# Patient Record
Sex: FEMALE | Race: OTHER | Employment: UNEMPLOYED | URBAN - METROPOLITAN AREA
[De-identification: names, ages, dates, MRNs, and addresses within clinical notes are randomized per-mention and may not be internally consistent; named-entity substitution may affect disease eponyms.]

---

## 2018-01-29 ENCOUNTER — OFFICE VISIT (OUTPATIENT)
Dept: OBGYN CLINIC | Age: 28
End: 2018-01-29

## 2018-01-29 ENCOUNTER — HOSPITAL ENCOUNTER (OUTPATIENT)
Age: 28
Setting detail: SPECIMEN
Discharge: HOME | End: 2018-01-29

## 2018-01-29 VITALS
HEIGHT: 64 IN | BODY MASS INDEX: 20.32 KG/M2 | WEIGHT: 119 LBS | DIASTOLIC BLOOD PRESSURE: 82 MMHG | SYSTOLIC BLOOD PRESSURE: 114 MMHG | HEART RATE: 98 BPM

## 2018-01-29 DIAGNOSIS — L65.9 PATCHY LOSS OF HAIR: ICD-10-CM

## 2018-01-29 DIAGNOSIS — L68.0 HIRSUTISM: Primary | ICD-10-CM

## 2018-01-29 DIAGNOSIS — Z82.49 FAMILY HISTORY OF DVT: ICD-10-CM

## 2018-01-29 DIAGNOSIS — L68.0 HIRSUTISM: ICD-10-CM

## 2018-01-29 DIAGNOSIS — E28.2 PCOS (POLYCYSTIC OVARIAN SYNDROME): ICD-10-CM

## 2018-01-29 LAB
SEX HORMONE BINDING GLOBULIN: 64 NMOL/L (ref 30–135)
TESTOSTERONE FREE-NONMALE: 4.6 PG/ML (ref 0.8–7.4)
TESTOSTERONE TOTAL: 40 NG/DL (ref 20–70)
THYROXINE, FREE: 1.24 NG/DL (ref 0.93–1.7)
TSH SERPL DL<=0.05 MIU/L-ACNC: 1.14 MIU/L (ref 0.3–5)

## 2018-01-29 PROCEDURE — 99213 OFFICE O/P EST LOW 20 MIN: CPT | Performed by: OBSTETRICS & GYNECOLOGY

## 2018-01-29 RX ORDER — SPIRONOLACTONE 25 MG/1
25 TABLET ORAL DAILY
Qty: 30 TABLET | Refills: 3 | Status: SHIPPED | OUTPATIENT
Start: 2018-01-29 | End: 2018-07-06 | Stop reason: SDUPTHER

## 2018-01-29 ASSESSMENT — ENCOUNTER SYMPTOMS
PHOTOPHOBIA: 0
SHORTNESS OF BREATH: 0
COUGH: 0
ABDOMINAL PAIN: 0

## 2018-01-29 NOTE — PROGRESS NOTES
[facility] PHYSICIANS  [facility] - [address] [place] [id number]  Dept: [phone]  Dept Fax: [phone]    18    Chief Complaint   Patient presents with   [address]    Irregular Menses       Vik Anderson 29 y.o. is here to discuss her PCOS. She is currently on metformin once a day and is now having periods every month to every other month. She is having increased facial hair growth and hair thinning on the crown of her head. She waxes and plucks her hair often. She has a family history of DVTs, so cannot be on combination birth control. Pt wants to know what she can do about her issues. Review of Systems   Constitutional: Positive for unexpected weight change (weight gain). Negative for activity change, chills and fever. HENT:        Hair loss  Facial hair increase   Eyes: Negative for photophobia. Respiratory: Negative for cough and shortness of breath. Gastrointestinal: Negative for abdominal pain. Endocrine: Negative for cold intolerance and heat intolerance. Genitourinary: Negative for menstrual problem and vaginal discharge. Neurological: Negative for dizziness and light-headedness. Gynecologic History  Patient's last menstrual period was 2017 (approximate). 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   No Known Allergies  Current Outpatient Prescriptions   Medication Sig Dispense Refill    spironolactone (ALDACTONE) 25 MG tablet Take 1 tablet by mouth daily 30 tablet 3    metFORMIN (GLUCOPHAGE) 500 MG tablet Take 1 tablet by mouth 2 times daily (with meals) 60 tablet 3 Patchy loss of hair  - Will rule out thyroid issues vs increased androgens with PCOS  - TSH; Future  - T4, Free; Future  - Testosterone, Free; Future    3. PCOS (polycystic ovarian syndrome)  - Pt unable to use birth control due to family h/o DVTs. Will try to continue to regulate periods with metformin  - spironolactone (ALDACTONE) 25 MG tablet; Take 1 tablet by mouth daily  Dispense: 30 tablet; Refill: 3  - metFORMIN (GLUCOPHAGE) 500 MG tablet; Take 1 tablet by mouth 2 times daily (with meals)  Dispense: 60 tablet;  Refill: 3    Will call pt with results of lab work    Tia Danielle MD  [address]

## 2018-06-26 ENCOUNTER — OFFICE VISIT (OUTPATIENT)
Dept: FAMILY MEDICINE CLINIC | Age: 28
End: 2018-06-26

## 2018-06-26 VITALS
OXYGEN SATURATION: 98 % | HEIGHT: 64 IN | WEIGHT: 111 LBS | HEART RATE: 94 BPM | DIASTOLIC BLOOD PRESSURE: 68 MMHG | SYSTOLIC BLOOD PRESSURE: 102 MMHG | BODY MASS INDEX: 18.95 KG/M2

## 2018-06-26 DIAGNOSIS — L57.8 DERMATITIS DUE TO SUN: Primary | ICD-10-CM

## 2018-06-26 DIAGNOSIS — Z00.00 PHYSICAL EXAM, ANNUAL: ICD-10-CM

## 2018-06-26 DIAGNOSIS — R01.2 ABNORMAL HEART SOUNDS: ICD-10-CM

## 2018-06-26 PROCEDURE — 99213 OFFICE O/P EST LOW 20 MIN: CPT | Performed by: FAMILY MEDICINE

## 2018-06-26 ASSESSMENT — ENCOUNTER SYMPTOMS
BACK PAIN: 0
EYE REDNESS: 0
DIARRHEA: 0
ABDOMINAL PAIN: 0
VOICE CHANGE: 0
COLOR CHANGE: 0
VOMITING: 0
RECTAL PAIN: 0
CONSTIPATION: 0
TROUBLE SWALLOWING: 0
CHEST TIGHTNESS: 0
BLOOD IN STOOL: 0
SINUS PRESSURE: 0
COUGH: 0
EYE PAIN: 0
EYE DISCHARGE: 0
ANAL BLEEDING: 0
ABDOMINAL DISTENTION: 0
NAUSEA: 0
SHORTNESS OF BREATH: 0

## 2018-06-26 ASSESSMENT — PATIENT HEALTH QUESTIONNAIRE - PHQ9
SUM OF ALL RESPONSES TO PHQ9 QUESTIONS 1 & 2: 0
SUM OF ALL RESPONSES TO PHQ QUESTIONS 1-9: 0
SUM OF ALL RESPONSES TO PHQ QUESTIONS 1-9: 0
2. FEELING DOWN, DEPRESSED OR HOPELESS: 0
1. LITTLE INTEREST OR PLEASURE IN DOING THINGS: 0
SUM OF ALL RESPONSES TO PHQ QUESTIONS 1-9: 0
SUM OF ALL RESPONSES TO PHQ9 QUESTIONS 1 & 2: 0
2. FEELING DOWN, DEPRESSED OR HOPELESS: 0
SUM OF ALL RESPONSES TO PHQ9 QUESTIONS 1 & 2: 0
1. LITTLE INTEREST OR PLEASURE IN DOING THINGS: 0
2. FEELING DOWN, DEPRESSED OR HOPELESS: 0
1. LITTLE INTEREST OR PLEASURE IN DOING THINGS: 0

## 2018-07-05 LAB
LEFT VENTRICULAR EJECTION FRACTION HIGH VALUE: NORMAL %
LEFT VENTRICULAR EJECTION FRACTION MODE: NORMAL
LV EF: NORMAL %

## 2018-07-06 DIAGNOSIS — E28.2 PCOS (POLYCYSTIC OVARIAN SYNDROME): ICD-10-CM

## 2018-07-06 DIAGNOSIS — L68.0 HIRSUTISM: ICD-10-CM

## 2018-07-06 RX ORDER — SPIRONOLACTONE 25 MG/1
25 TABLET ORAL DAILY
Qty: 30 TABLET | Refills: 3 | Status: SHIPPED | OUTPATIENT
Start: 2018-07-06 | End: 2018-12-17 | Stop reason: SDUPTHER

## 2018-07-13 DIAGNOSIS — R01.2 ABNORMAL HEART SOUNDS: ICD-10-CM

## 2018-07-24 ENCOUNTER — TELEPHONE (OUTPATIENT)
Dept: FAMILY MEDICINE CLINIC | Age: 28
End: 2018-07-24

## 2018-11-23 DIAGNOSIS — E28.2 PCOS (POLYCYSTIC OVARIAN SYNDROME): ICD-10-CM

## 2018-11-23 DIAGNOSIS — L68.0 HIRSUTISM: ICD-10-CM

## 2018-12-17 DIAGNOSIS — E28.2 PCOS (POLYCYSTIC OVARIAN SYNDROME): ICD-10-CM

## 2018-12-17 DIAGNOSIS — L68.0 HIRSUTISM: ICD-10-CM

## 2018-12-17 RX ORDER — SPIRONOLACTONE 25 MG/1
25 TABLET ORAL DAILY
Qty: 30 TABLET | Refills: 3 | Status: SHIPPED | OUTPATIENT
Start: 2018-12-17 | End: 2019-02-25 | Stop reason: CLARIF

## 2019-01-11 ENCOUNTER — OFFICE VISIT (OUTPATIENT)
Dept: OBGYN CLINIC | Age: 29
End: 2019-01-11

## 2019-01-11 VITALS
HEART RATE: 85 BPM | DIASTOLIC BLOOD PRESSURE: 74 MMHG | HEIGHT: 64 IN | SYSTOLIC BLOOD PRESSURE: 123 MMHG | BODY MASS INDEX: 18.78 KG/M2 | WEIGHT: 110 LBS

## 2019-01-11 DIAGNOSIS — L64.9 MALE PATTERN BALDNESS: ICD-10-CM

## 2019-01-11 DIAGNOSIS — E28.2 PCOS (POLYCYSTIC OVARIAN SYNDROME): Primary | ICD-10-CM

## 2019-01-11 DIAGNOSIS — L68.0 HIRSUTISM: ICD-10-CM

## 2019-01-11 PROCEDURE — 99213 OFFICE O/P EST LOW 20 MIN: CPT | Performed by: OBSTETRICS & GYNECOLOGY

## 2019-01-11 ASSESSMENT — ENCOUNTER SYMPTOMS
COUGH: 0
ABDOMINAL PAIN: 0
SHORTNESS OF BREATH: 0
BACK PAIN: 0

## 2019-02-22 ENCOUNTER — TELEPHONE (OUTPATIENT)
Dept: OBGYN CLINIC | Age: 29
End: 2019-02-22

## 2019-02-25 ENCOUNTER — OFFICE VISIT (OUTPATIENT)
Dept: FAMILY MEDICINE CLINIC | Age: 29
End: 2019-02-25

## 2019-02-25 VITALS
SYSTOLIC BLOOD PRESSURE: 116 MMHG | HEART RATE: 102 BPM | TEMPERATURE: 97.2 F | OXYGEN SATURATION: 98 % | DIASTOLIC BLOOD PRESSURE: 76 MMHG

## 2019-02-25 DIAGNOSIS — Z11.4 SCREENING FOR HIV (HUMAN IMMUNODEFICIENCY VIRUS): ICD-10-CM

## 2019-02-25 DIAGNOSIS — R07.9 CHEST PAIN, UNSPECIFIED TYPE: ICD-10-CM

## 2019-02-25 DIAGNOSIS — E53.9 VITAMIN B DEFICIENCY: ICD-10-CM

## 2019-02-25 DIAGNOSIS — R68.89 SENSITIVITY TO THE COLD: ICD-10-CM

## 2019-02-25 DIAGNOSIS — Z87.2: ICD-10-CM

## 2019-02-25 DIAGNOSIS — E55.9 VITAMIN D DEFICIENCY: Primary | ICD-10-CM

## 2019-02-25 DIAGNOSIS — I73.00 RAYNAUD'S PHENOMENON WITHOUT GANGRENE: ICD-10-CM

## 2019-02-25 LAB
ALBUMIN SERPL-MCNC: NORMAL G/DL
ALP BLD-CCNC: NORMAL U/L
ALT SERPL-CCNC: NORMAL U/L
ANION GAP SERPL CALCULATED.3IONS-SCNC: NORMAL MMOL/L
AST SERPL-CCNC: NORMAL U/L
BASOPHILS ABSOLUTE: NORMAL /ΜL
BASOPHILS RELATIVE PERCENT: NORMAL %
BILIRUB SERPL-MCNC: NORMAL MG/DL (ref 0.1–1.4)
BUN BLDV-MCNC: NORMAL MG/DL
CALCIUM SERPL-MCNC: NORMAL MG/DL
CHLORIDE BLD-SCNC: NORMAL MMOL/L
CO2: NORMAL MMOL/L
CREAT SERPL-MCNC: 0.72 MG/DL
EOSINOPHILS ABSOLUTE: NORMAL /ΜL
EOSINOPHILS RELATIVE PERCENT: NORMAL %
GFR CALCULATED: NORMAL
GLUCOSE BLD-MCNC: NORMAL MG/DL
HCT VFR BLD CALC: NORMAL % (ref 36–46)
HEMOGLOBIN: NORMAL G/DL (ref 12–16)
LYMPHOCYTES ABSOLUTE: NORMAL /ΜL
LYMPHOCYTES RELATIVE PERCENT: NORMAL %
MCH RBC QN AUTO: NORMAL PG
MCHC RBC AUTO-ENTMCNC: NORMAL G/DL
MCV RBC AUTO: NORMAL FL
MONOCYTES ABSOLUTE: NORMAL /ΜL
MONOCYTES RELATIVE PERCENT: NORMAL %
NEUTROPHILS ABSOLUTE: NORMAL /ΜL
NEUTROPHILS RELATIVE PERCENT: NORMAL %
PLATELET # BLD: NORMAL K/ΜL
PMV BLD AUTO: NORMAL FL
POTASSIUM SERPL-SCNC: 4.5 MMOL/L
RBC # BLD: NORMAL 10^6/ΜL
SODIUM BLD-SCNC: NORMAL MMOL/L
TOTAL PROTEIN: NORMAL
VITAMIN B-12: NORMAL
WBC # BLD: NORMAL 10^3/ML

## 2019-02-25 PROCEDURE — 99204 OFFICE O/P NEW MOD 45 MIN: CPT | Performed by: FAMILY MEDICINE

## 2019-02-25 ASSESSMENT — PATIENT HEALTH QUESTIONNAIRE - PHQ9
2. FEELING DOWN, DEPRESSED OR HOPELESS: 1
2. FEELING DOWN, DEPRESSED OR HOPELESS: 0
1. LITTLE INTEREST OR PLEASURE IN DOING THINGS: 0
SUM OF ALL RESPONSES TO PHQ QUESTIONS 1-9: 1
SUM OF ALL RESPONSES TO PHQ QUESTIONS 1-9: 0
SUM OF ALL RESPONSES TO PHQ9 QUESTIONS 1 & 2: 0
SUM OF ALL RESPONSES TO PHQ QUESTIONS 1-9: 1
SUM OF ALL RESPONSES TO PHQ QUESTIONS 1-9: 0
SUM OF ALL RESPONSES TO PHQ9 QUESTIONS 1 & 2: 1
1. LITTLE INTEREST OR PLEASURE IN DOING THINGS: 0

## 2019-02-25 ASSESSMENT — ENCOUNTER SYMPTOMS
ANAL BLEEDING: 0
RECTAL PAIN: 0
BLOOD IN STOOL: 0
NAUSEA: 0
EYE DISCHARGE: 0
TROUBLE SWALLOWING: 0
VOMITING: 0
BACK PAIN: 0
COLOR CHANGE: 0
CONSTIPATION: 0
CHEST TIGHTNESS: 0
VOICE CHANGE: 0
COUGH: 0
ABDOMINAL PAIN: 0
SHORTNESS OF BREATH: 0
EYE REDNESS: 0
EYE PAIN: 0
DIARRHEA: 0
ABDOMINAL DISTENTION: 0
SINUS PRESSURE: 0

## 2019-02-26 DIAGNOSIS — E53.9 VITAMIN B DEFICIENCY: ICD-10-CM

## 2019-02-26 DIAGNOSIS — R07.9 CHEST PAIN, UNSPECIFIED TYPE: ICD-10-CM

## 2019-08-21 DIAGNOSIS — E28.2 PCOS (POLYCYSTIC OVARIAN SYNDROME): ICD-10-CM

## 2019-08-21 DIAGNOSIS — L68.0 HIRSUTISM: ICD-10-CM

## 2019-09-05 ENCOUNTER — TELEPHONE (OUTPATIENT)
Dept: OBGYN CLINIC | Age: 29
End: 2019-09-05

## 2019-09-05 NOTE — TELEPHONE ENCOUNTER
BEBETO for pt to call prior to her appt today. Needs to know that she typically sees Dr. Geri Stewart but is on [facility] schedule. Pt can keep appointment if she decides that is what she wants to do or we can offer for her to see Dr. Emmanuel Nogueira this afternoon.

## 2019-09-10 ENCOUNTER — OFFICE VISIT (OUTPATIENT)
Dept: FAMILY MEDICINE CLINIC | Age: 29
End: 2019-09-10
Payer: MEDICAID

## 2019-09-10 VITALS
HEIGHT: 64 IN | SYSTOLIC BLOOD PRESSURE: 108 MMHG | BODY MASS INDEX: 17.72 KG/M2 | DIASTOLIC BLOOD PRESSURE: 78 MMHG | OXYGEN SATURATION: 98 % | WEIGHT: 103.8 LBS | HEART RATE: 103 BPM

## 2019-09-10 DIAGNOSIS — L65.9 HAIR LOSS: ICD-10-CM

## 2019-09-10 DIAGNOSIS — L67.8 ABNORMAL FACIAL HAIR: Primary | ICD-10-CM

## 2019-09-10 DIAGNOSIS — R20.9 COLD HANDS AND FEET: ICD-10-CM

## 2019-09-10 DIAGNOSIS — E55.9 VITAMIN D DEFICIENCY: ICD-10-CM

## 2019-09-10 DIAGNOSIS — Z13.29 SCREENING FOR THYROID DISORDER: ICD-10-CM

## 2019-09-10 DIAGNOSIS — R68.89 SENSITIVITY TO THE COLD: ICD-10-CM

## 2019-09-10 DIAGNOSIS — F41.1 GAD (GENERALIZED ANXIETY DISORDER): ICD-10-CM

## 2019-09-10 PROCEDURE — 99214 OFFICE O/P EST MOD 30 MIN: CPT | Performed by: FAMILY MEDICINE

## 2019-09-10 PROCEDURE — G8419 CALC BMI OUT NRM PARAM NOF/U: HCPCS | Performed by: FAMILY MEDICINE

## 2019-09-10 PROCEDURE — 1036F TOBACCO NON-USER: CPT | Performed by: FAMILY MEDICINE

## 2019-09-10 PROCEDURE — G8427 DOCREV CUR MEDS BY ELIG CLIN: HCPCS | Performed by: FAMILY MEDICINE

## 2019-09-10 RX ORDER — BUSPIRONE HYDROCHLORIDE 10 MG/1
10 TABLET ORAL 3 TIMES DAILY PRN
Qty: 30 TABLET | Refills: 0 | Status: SHIPPED | OUTPATIENT
Start: 2019-09-10 | End: 2022-03-23

## 2019-09-10 ASSESSMENT — ENCOUNTER SYMPTOMS
BACK PAIN: 0
EYE DISCHARGE: 0
CONSTIPATION: 0
SHORTNESS OF BREATH: 0
SINUS PRESSURE: 0
VOICE CHANGE: 0
COUGH: 0
TROUBLE SWALLOWING: 0
VOMITING: 0
DIARRHEA: 0
ABDOMINAL DISTENTION: 0
EYE REDNESS: 0
ABDOMINAL PAIN: 0
EYE PAIN: 0
RECTAL PAIN: 0
CHEST TIGHTNESS: 0
COLOR CHANGE: 0
NAUSEA: 0
BLOOD IN STOOL: 0
ANAL BLEEDING: 0

## 2019-09-10 ASSESSMENT — PATIENT HEALTH QUESTIONNAIRE - PHQ9
SUM OF ALL RESPONSES TO PHQ QUESTIONS 1-9: 0
SUM OF ALL RESPONSES TO PHQ QUESTIONS 1-9: 0
SUM OF ALL RESPONSES TO PHQ9 QUESTIONS 1 & 2: 0
1. LITTLE INTEREST OR PLEASURE IN DOING THINGS: 0
2. FEELING DOWN, DEPRESSED OR HOPELESS: 0

## 2019-09-10 NOTE — PROGRESS NOTES
Subjective:      Patient ID: Crystal Quesada is a 34 y.o. female. HPI  States feet and toes get numb when exposed to the cold, has to wear loose sock as even happens when   The socks are tight. Seriously thinking of relocating as sx are worse in the winter. I had referred her to a specialist for these complaints but she never did follow up- states does not know what happened to the referral.  States does not take buspar but wants a refill just in case she has a bad episode. Still not wanting to take meds for this long term as well. Also c/o chin hair that is growing more than usual, states worse after stopping spironolactone. States also hait to the top of her head getting more and more sparse. Last visit had ordered thyroid as well as Vit D as well as other labs, some done but not all, so will reorder ones she did not do. 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 Review of Systems   Constitutional: Negative for activity change, appetite change and fatigue. HENT: Negative for dental problem, ear pain, hearing loss, postnasal drip, sinus pressure, sneezing, tinnitus, trouble swallowing and voice change. Eyes: Negative for pain, discharge, redness and visual disturbance. Respiratory: Negative for cough, chest tightness and shortness of breath. Cardiovascular: Negative for chest pain, palpitations and leg swelling. Gastrointestinal: Negative for abdominal distention, abdominal pain, anal bleeding, blood in stool, constipation, diarrhea, nausea, rectal pain and vomiting. Endocrine: Negative for cold intolerance, heat intolerance, polydipsia, polyphagia and polyuria. Genitourinary: Negative for decreased urine volume, difficulty urinating, dyspareunia, dysuria, enuresis, flank pain, frequency, genital sores, hematuria, menstrual problem, pelvic pain, urgency, vaginal bleeding and vaginal discharge.    Musculoskeletal: Negative for arthralgias, back pain, gait problem, joint swelling, myalgias, neck pain and neck stiffness. Skin: Negative for color change, pallor and rash. Allergic/Immunologic: Negative for environmental allergies, food allergies and immunocompromised state. Neurological: Negative for dizziness, tremors, seizures, syncope, facial asymmetry, speech difficulty, weakness, light-headedness, numbness and headaches. Hematological: Negative for adenopathy. Does not bruise/bleed easily. Psychiatric/Behavioral: Negative for agitation, behavioral problems, confusion, decreased concentration, sleep disturbance and suicidal ideas. The patient is not nervous/anxious. Objective:   Physical Exam   Constitutional: She is oriented to person, place, and time. No distress. HENT:   Head: Normocephalic and atraumatic. Right Ear: External ear normal.   Left Ear: External ear normal.   Eyes: Pupils are equal, round, and reactive to light. Conjunctivae and EOM are normal.   Neck: Normal range of motion. No tracheal deviation present. No thyromegaly present. Cardiovascular: Normal rate, regular rhythm and intact distal pulses. Exam reveals no gallop and no friction rub. No murmur heard. Pulmonary/Chest: No stridor. No respiratory distress. She has no wheezes. She has no rales. She exhibits no tenderness. Abdominal: Soft. Bowel sounds are normal. She exhibits no distension. There is no tenderness. There is no rebound. Musculoskeletal: Normal range of motion. Lymphadenopathy:     She has no cervical adenopathy. Neurological: She is alert and oriented to person, place, and time. She displays normal reflexes. No cranial nerve deficit. She exhibits normal muscle tone. Skin: Skin is warm. No rash noted. No erythema. No pallor. Assessment:       Diagnosis Orders   1. Abnormal facial hair  Isaiah Do MD, Dermatology, Ginny Shepherd MD, Dermatology, [place]   2.  OLIVIA (generalized

## 2019-09-19 ENCOUNTER — HOSPITAL ENCOUNTER (OUTPATIENT)
Age: 29
Setting detail: SPECIMEN
Discharge: HOME | End: 2019-09-19
Payer: MEDICAID

## 2019-09-19 DIAGNOSIS — E55.9 VITAMIN D DEFICIENCY: ICD-10-CM

## 2019-09-19 DIAGNOSIS — Z13.29 SCREENING FOR THYROID DISORDER: ICD-10-CM

## 2019-09-19 LAB
T3 FREE: 2.44 PG/ML (ref 2.02–4.43)
THYROXINE, FREE: 1.21 NG/DL (ref 0.93–1.7)
TSH SERPL DL<=0.05 MIU/L-ACNC: 1.29 MIU/L (ref 0.3–5)
VITAMIN D 25-HYDROXY: 49.8 NG/ML (ref 30–100)

## 2019-09-24 ENCOUNTER — OFFICE VISIT (OUTPATIENT)
Dept: OBGYN CLINIC | Age: 29
End: 2019-09-24
Payer: MEDICAID

## 2019-09-24 ENCOUNTER — HOSPITAL ENCOUNTER (OUTPATIENT)
Age: 29
Setting detail: SPECIMEN
Discharge: HOME | End: 2019-09-24
Payer: MEDICAID

## 2019-09-24 VITALS
WEIGHT: 113 LBS | HEART RATE: 60 BPM | SYSTOLIC BLOOD PRESSURE: 108 MMHG | BODY MASS INDEX: 19.29 KG/M2 | HEIGHT: 64 IN | DIASTOLIC BLOOD PRESSURE: 66 MMHG

## 2019-09-24 DIAGNOSIS — L68.0 HIRSUTISM: ICD-10-CM

## 2019-09-24 DIAGNOSIS — L65.9 ALOPECIA: Primary | ICD-10-CM

## 2019-09-24 DIAGNOSIS — E28.2 PCOS (POLYCYSTIC OVARIAN SYNDROME): ICD-10-CM

## 2019-09-24 LAB
SEX HORMONE BINDING GLOBULIN: 67 NMOL/L (ref 30–135)
TESTOSTERONE FREE-NONMALE: 2.2 PG/ML (ref 0.8–7.4)
TESTOSTERONE TOTAL: 20 NG/DL (ref 20–70)

## 2019-09-24 PROCEDURE — G8427 DOCREV CUR MEDS BY ELIG CLIN: HCPCS | Performed by: OBSTETRICS & GYNECOLOGY

## 2019-09-24 PROCEDURE — 1036F TOBACCO NON-USER: CPT | Performed by: OBSTETRICS & GYNECOLOGY

## 2019-09-24 PROCEDURE — G8420 CALC BMI NORM PARAMETERS: HCPCS | Performed by: OBSTETRICS & GYNECOLOGY

## 2019-09-24 PROCEDURE — 99213 OFFICE O/P EST LOW 20 MIN: CPT | Performed by: OBSTETRICS & GYNECOLOGY

## 2019-09-24 RX ORDER — SPIRONOLACTONE 25 MG/1
25 TABLET ORAL DAILY
Qty: 30 TABLET | Refills: 5 | Status: SHIPPED | OUTPATIENT
Start: 2019-09-24 | End: 2020-10-14

## 2019-09-24 ASSESSMENT — ENCOUNTER SYMPTOMS
COUGH: 0
ABDOMINAL PAIN: 0
SHORTNESS OF BREATH: 0
BACK PAIN: 0

## 2019-10-02 ENCOUNTER — OFFICE VISIT (OUTPATIENT)
Dept: DERMATOLOGY | Age: 29
End: 2019-10-02
Payer: MEDICAID

## 2019-10-02 VITALS
OXYGEN SATURATION: 97 % | SYSTOLIC BLOOD PRESSURE: 108 MMHG | DIASTOLIC BLOOD PRESSURE: 76 MMHG | HEART RATE: 84 BPM | BODY MASS INDEX: 20.93 KG/M2 | WEIGHT: 106.6 LBS | HEIGHT: 60 IN

## 2019-10-02 DIAGNOSIS — L65.8 FEMALE PATTERN ALOPECIA: ICD-10-CM

## 2019-10-02 DIAGNOSIS — L21.9 SEBORRHEIC DERMATITIS: Primary | ICD-10-CM

## 2019-10-02 DIAGNOSIS — L68.0 HIRSUTISM: ICD-10-CM

## 2019-10-02 PROCEDURE — G8427 DOCREV CUR MEDS BY ELIG CLIN: HCPCS | Performed by: DERMATOLOGY

## 2019-10-02 PROCEDURE — 1036F TOBACCO NON-USER: CPT | Performed by: DERMATOLOGY

## 2019-10-02 PROCEDURE — 99202 OFFICE O/P NEW SF 15 MIN: CPT | Performed by: DERMATOLOGY

## 2019-10-02 PROCEDURE — G8420 CALC BMI NORM PARAMETERS: HCPCS | Performed by: DERMATOLOGY

## 2019-10-02 PROCEDURE — G8484 FLU IMMUNIZE NO ADMIN: HCPCS | Performed by: DERMATOLOGY

## 2019-10-02 RX ORDER — KETOCONAZOLE 20 MG/ML
SHAMPOO TOPICAL
Qty: 120 ML | Refills: 2 | Status: SHIPPED | OUTPATIENT
Start: 2019-10-02 | End: 2020-10-14

## 2019-10-02 RX ORDER — FLUOCINONIDE TOPICAL SOLUTION USP, 0.05% 0.5 MG/ML
SOLUTION TOPICAL
Qty: 60 ML | Refills: 2 | Status: SHIPPED | OUTPATIENT
Start: 2019-10-02 | End: 2020-10-14

## 2019-12-23 ENCOUNTER — TELEPHONE (OUTPATIENT)
Dept: DERMATOLOGY | Age: 29
End: 2019-12-23

## 2019-12-23 NOTE — TELEPHONE ENCOUNTER
Call from pt says you recommended she use rogaine on her scalp but can she usee it at the same time she is using the other meds that were prescribed and is it otc or does she needs a Rx.   Pharm [facility]/ Manjeet Hoffmann and JANAY Brown

## 2020-05-20 ENCOUNTER — TELEMEDICINE (OUTPATIENT)
Dept: DERMATOLOGY | Age: 30
End: 2020-05-20
Payer: MEDICAID

## 2020-05-20 PROCEDURE — G8428 CUR MEDS NOT DOCUMENT: HCPCS | Performed by: DERMATOLOGY

## 2020-05-20 PROCEDURE — 99213 OFFICE O/P EST LOW 20 MIN: CPT | Performed by: DERMATOLOGY

## 2020-05-20 RX ORDER — FLUOCINOLONE ACETONIDE 0.11 MG/ML
OIL TOPICAL
Qty: 120 ML | Refills: 2 | Status: SHIPPED | OUTPATIENT
Start: 2020-05-20 | End: 2020-10-14

## 2020-05-20 NOTE — PROGRESS NOTES
TELEHEALTH EVALUATION -- Audio/Visual (During TXNVO-63 public health emergency)    Dermatology Patient Note  [facility] [id number]  [address] [id number]  Dept: [phone]  Dept Fax: [phone]      VISIT DATE: 5/20/2020   REFERRING PROVIDER: No ref. provider found      Sy Cornell is a 27 y.o. female  who presents today in the office for:    No chief complaint on file. HISTORY OF PRESENT ILLNESS:  Patient with PCOS presents for f/u seborrheic dermatitis and hirsutism  Interval history: patient continues to have itchy scalp with sores. Used ketoconazole which helped some but she thought it was too drying for her hair. Is now using a tea tree shampoo and it seems to be calming it. She used the fluocinonide solution for just one month and didn't notice a difference so stopped. She stopped spironolactone because it made her lightheaded.    Current treatment and adherence: tea tree shampoo only  Prior treatments tried: see initial HPI  Is using a home IPL for laser hair removal, wants to know if she can use it on abdomen or face or what else I might recommend for hair removal      CURRENT MEDICATIONS:   Current Outpatient Medications   Medication Sig Dispense Refill    fluocinolone (DERMA-SMOOTHE/FS BODY) 0.01 % OIL external oil Massage into scalp at night and wash off in the morning 3-4 times per week 120 mL 2    mupirocin (BACTROBAN) 2 % ointment Apply 3 times daily to sores on scalp until resolved 15 g 2    metFORMIN (GLUCOPHAGE) 500 MG tablet Take 1 tablet by mouth 2 times daily (with meals) 60 tablet 5    ketoconazole (NIZORAL) 2 % shampoo Apply 3-4 times weekly to scalp, leave on for five minutes prior to washing off 120 mL 2    fluocinonide (LIDEX) 0.05 % external solution Apply to scalp daily for rash 60 mL 2    spironolactone (ALDACTONE) 25 MG tablet Take 1 tablet by mouth daily (Patient not taking: Reported on 10/2/2019) 30 tablet 5   

## 2020-05-20 NOTE — PATIENT INSTRUCTIONS
Massage fluocinolone oil into at night, cover with shower cap and shampoo off in the morning  If you develop a sore, apply mupirocin ointment up to 3 times daily until resolved    Consider laser hair removal with Nd:yag (Dr. Margaret Clemente) or electrolysis    Follow up 2 months

## 2020-10-14 ENCOUNTER — OFFICE VISIT (OUTPATIENT)
Dept: FAMILY MEDICINE CLINIC | Age: 30
End: 2020-10-14
Payer: MEDICAID

## 2020-10-14 VITALS
OXYGEN SATURATION: 100 % | HEIGHT: 60 IN | HEART RATE: 106 BPM | DIASTOLIC BLOOD PRESSURE: 72 MMHG | SYSTOLIC BLOOD PRESSURE: 103 MMHG | TEMPERATURE: 98.1 F | BODY MASS INDEX: 21.2 KG/M2 | WEIGHT: 108 LBS

## 2020-10-14 PROBLEM — R42 LIGHTHEADEDNESS: Status: ACTIVE | Noted: 2020-10-14

## 2020-10-14 PROBLEM — K59.09 OTHER CONSTIPATION: Status: ACTIVE | Noted: 2020-10-14

## 2020-10-14 PROBLEM — R14.0 BLOATING: Status: ACTIVE | Noted: 2020-10-14

## 2020-10-14 PROBLEM — E28.2 PCOS (POLYCYSTIC OVARIAN SYNDROME): Status: ACTIVE | Noted: 2020-10-14

## 2020-10-14 LAB
ALBUMIN SERPL-MCNC: 4.6 G/DL
ALP BLD-CCNC: 49 U/L
ALT SERPL-CCNC: 8 U/L
ANION GAP SERPL CALCULATED.3IONS-SCNC: NORMAL MMOL/L
AST SERPL-CCNC: 21 U/L
AVERAGE GLUCOSE: 103
BASOPHILS ABSOLUTE: NORMAL
BASOPHILS RELATIVE PERCENT: NORMAL
BILIRUB SERPL-MCNC: 0.4 MG/DL (ref 0.1–1.4)
BUN BLDV-MCNC: 9 MG/DL
CALCIUM SERPL-MCNC: 9.6 MG/DL
CHLORIDE BLD-SCNC: 103 MMOL/L
CO2: 26 MMOL/L
CREAT SERPL-MCNC: 0.65 MG/DL
EOSINOPHILS ABSOLUTE: NORMAL
EOSINOPHILS RELATIVE PERCENT: NORMAL
GFR CALCULATED: NORMAL
GLUCOSE BLD-MCNC: 90 MG/DL
HBA1C MFR BLD: 5.2 %
HCT VFR BLD CALC: NORMAL %
HEMOGLOBIN: NORMAL
HIV AG/AB: NORMAL
LYMPHOCYTES ABSOLUTE: NORMAL
LYMPHOCYTES RELATIVE PERCENT: NORMAL
MCH RBC QN AUTO: NORMAL PG
MCHC RBC AUTO-ENTMCNC: NORMAL G/DL
MCV RBC AUTO: NORMAL FL
MONOCYTES ABSOLUTE: NORMAL
MONOCYTES RELATIVE PERCENT: NORMAL
NEUTROPHILS ABSOLUTE: NORMAL
NEUTROPHILS RELATIVE PERCENT: NORMAL
PLATELET # BLD: NORMAL 10*3/UL
PMV BLD AUTO: NORMAL FL
POTASSIUM SERPL-SCNC: 4.1 MMOL/L
RBC # BLD: NORMAL 10*6/UL
SODIUM BLD-SCNC: 142 MMOL/L
T3 FREE: NORMAL
T4 FREE: NORMAL
TOTAL PROTEIN: 7.4
TSH SERPL DL<=0.05 MIU/L-ACNC: 1.17 UIU/ML
VITAMIN B-12: NORMAL
VITAMIN D 25-HYDROXY: NORMAL
VITAMIN D2, 25 HYDROXY: NORMAL
VITAMIN D3,25 HYDROXY: NORMAL
WBC # BLD: NORMAL 10*3/UL

## 2020-10-14 PROCEDURE — G8482 FLU IMMUNIZE ORDER/ADMIN: HCPCS | Performed by: FAMILY MEDICINE

## 2020-10-14 PROCEDURE — 90471 IMMUNIZATION ADMIN: CPT | Performed by: FAMILY MEDICINE

## 2020-10-14 PROCEDURE — 90686 IIV4 VACC NO PRSV 0.5 ML IM: CPT | Performed by: FAMILY MEDICINE

## 2020-10-14 PROCEDURE — 1036F TOBACCO NON-USER: CPT | Performed by: FAMILY MEDICINE

## 2020-10-14 PROCEDURE — G8427 DOCREV CUR MEDS BY ELIG CLIN: HCPCS | Performed by: FAMILY MEDICINE

## 2020-10-14 PROCEDURE — 99214 OFFICE O/P EST MOD 30 MIN: CPT | Performed by: FAMILY MEDICINE

## 2020-10-14 PROCEDURE — G8420 CALC BMI NORM PARAMETERS: HCPCS | Performed by: FAMILY MEDICINE

## 2020-10-14 RX ORDER — BUSPIRONE HYDROCHLORIDE 5 MG/1
5 TABLET ORAL 3 TIMES DAILY
Qty: 30 TABLET | Refills: 0 | Status: SHIPPED | OUTPATIENT
Start: 2020-10-14 | End: 2020-10-24

## 2020-10-14 ASSESSMENT — PATIENT HEALTH QUESTIONNAIRE - PHQ9
2. FEELING DOWN, DEPRESSED OR HOPELESS: 0
SUM OF ALL RESPONSES TO PHQ QUESTIONS 1-9: 0
SUM OF ALL RESPONSES TO PHQ9 QUESTIONS 1 & 2: 0
SUM OF ALL RESPONSES TO PHQ9 QUESTIONS 1 & 2: 0
SUM OF ALL RESPONSES TO PHQ QUESTIONS 1-9: 0
2. FEELING DOWN, DEPRESSED OR HOPELESS: 0
SUM OF ALL RESPONSES TO PHQ9 QUESTIONS 1 & 2: 0
1. LITTLE INTEREST OR PLEASURE IN DOING THINGS: 0
SUM OF ALL RESPONSES TO PHQ QUESTIONS 1-9: 0
2. FEELING DOWN, DEPRESSED OR HOPELESS: 0
1. LITTLE INTEREST OR PLEASURE IN DOING THINGS: 0
1. LITTLE INTEREST OR PLEASURE IN DOING THINGS: 0

## 2020-10-14 ASSESSMENT — ENCOUNTER SYMPTOMS
COLOR CHANGE: 0
ABDOMINAL PAIN: 0
TROUBLE SWALLOWING: 0
EYE DISCHARGE: 0
EYE REDNESS: 0
ANAL BLEEDING: 0
EYE PAIN: 0
CONSTIPATION: 0
VOMITING: 0
VOICE CHANGE: 0
BACK PAIN: 0
DIARRHEA: 0
NAUSEA: 0
CHEST TIGHTNESS: 0
ABDOMINAL DISTENTION: 0
COUGH: 0
BLOOD IN STOOL: 0
SINUS PRESSURE: 0
SHORTNESS OF BREATH: 0
RECTAL PAIN: 0

## 2020-10-14 NOTE — PROGRESS NOTES
rash.   Allergic/Immunologic: Negative for environmental allergies, food allergies and immunocompromised state. Neurological: Positive for dizziness and light-headedness. Negative for tremors, seizures, syncope, facial asymmetry, speech difficulty, weakness, numbness and headaches. Hematological: Negative for adenopathy. Does not bruise/bleed easily. Psychiatric/Behavioral: Negative for agitation, behavioral problems, confusion, decreased concentration, sleep disturbance and suicidal ideas. The patient is not nervous/anxious. Objective:   Physical Exam  Constitutional:       General: She is not in acute distress. HENT:      Head: Normocephalic and atraumatic. Right Ear: External ear normal.      Left Ear: External ear normal.   Eyes:      Conjunctiva/sclera: Conjunctivae normal.      Pupils: Pupils are equal, round, and reactive to light. Neck:      Musculoskeletal: Normal range of motion. Thyroid: No thyromegaly. Trachea: No tracheal deviation. Cardiovascular:      Rate and Rhythm: Normal rate and regular rhythm. Heart sounds: No murmur. No friction rub. No gallop. Pulmonary:      Effort: No respiratory distress. Breath sounds: No stridor. No wheezing or rales. Chest:      Chest wall: No tenderness. Abdominal:      General: Bowel sounds are normal. There is no distension. Palpations: Abdomen is soft. Tenderness: There is no abdominal tenderness. There is no rebound. Musculoskeletal: Normal range of motion. Lymphadenopathy:      Cervical: No cervical adenopathy. Skin:     General: Skin is warm. Coloration: Skin is not pale. Findings: No erythema or rash. Neurological:      Mental Status: She is alert and oriented to person, place, and time. Cranial Nerves: No cranial nerve deficit. Motor: No abnormal muscle tone. Deep Tendon Reflexes: Reflexes normal.         Assessment:       Diagnosis Orders   1.  Need for influenza vaccination  INFLUENZA, QUADV, 3 YRS AND OLDER, IM PF, PREFILL SYR OR SDV, 0.5ML (AFLURIA QUADV, PF)   2. OLIVIA (generalized anxiety disorder)     3. PCOS (polycystic ovarian syndrome)     4. Bloating  [facility]   5. Other constipation  [facility]   6. Lightheadedness           Plan:      Orders Placed This Encounter   Procedures    INFLUENZA, QUADV, 3 YRS AND OLDER, IM PF, PREFILL SYR OR SDV, 0.5ML (Suellyangle Brown, PF)   [facility], [facility]       Outpatient Encounter Medications as of 10/14/2020   Medication Sig Dispense Refill    busPIRone (BUSPAR) 10 MG tablet Take 1 tablet by mouth 3 times daily as needed (anxiety) 30 tablet 0    metFORMIN (GLUCOPHAGE) 500 MG tablet Take 1 tablet by mouth 2 times daily (with meals) (Patient taking differently: Take 500 mg by mouth daily (with breakfast) ) 60 tablet 3    [DISCONTINUED] fluocinolone (DERMA-SMOOTHE/FS BODY) 0.01 % OIL external oil Massage into scalp at night and wash off in the morning 3-4 times per week (Patient not taking: Reported on 10/14/2020) 120 mL 2    [DISCONTINUED] metFORMIN (GLUCOPHAGE) 500 MG tablet Take 1 tablet by mouth 2 times daily (with meals) 60 tablet 5    [DISCONTINUED] ketoconazole (NIZORAL) 2 % shampoo Apply 3-4 times weekly to scalp, leave on for five minutes prior to washing off (Patient not taking: Reported on 10/14/2020) 120 mL 2    [DISCONTINUED] fluocinonide (LIDEX) 0.05 % external solution Apply to scalp daily for rash (Patient not taking: Reported on 10/14/2020) 60 mL 2    [DISCONTINUED] spironolactone (ALDACTONE) 25 MG tablet Take 1 tablet by mouth daily (Patient not taking: Reported on 10/2/2019) 30 tablet 5     No facility-administered encounter medications on file as of 10/14/2020.             Tita Schmid MD

## 2020-10-22 ENCOUNTER — TELEPHONE (OUTPATIENT)
Dept: FAMILY MEDICINE CLINIC | Age: 30
End: 2020-10-22

## 2020-11-04 ENCOUNTER — TELEPHONE (OUTPATIENT)
Dept: GASTROENTEROLOGY | Age: 30
End: 2020-11-04

## 2020-11-12 ENCOUNTER — OFFICE VISIT (OUTPATIENT)
Dept: FAMILY MEDICINE CLINIC | Age: 30
End: 2020-11-12
Payer: MEDICAID

## 2020-11-12 VITALS
WEIGHT: 105.6 LBS | HEART RATE: 98 BPM | HEIGHT: 60 IN | DIASTOLIC BLOOD PRESSURE: 74 MMHG | OXYGEN SATURATION: 99 % | SYSTOLIC BLOOD PRESSURE: 104 MMHG | TEMPERATURE: 97.2 F | BODY MASS INDEX: 20.73 KG/M2

## 2020-11-12 PROCEDURE — G8482 FLU IMMUNIZE ORDER/ADMIN: HCPCS | Performed by: FAMILY MEDICINE

## 2020-11-12 PROCEDURE — G8420 CALC BMI NORM PARAMETERS: HCPCS | Performed by: FAMILY MEDICINE

## 2020-11-12 PROCEDURE — 99213 OFFICE O/P EST LOW 20 MIN: CPT | Performed by: FAMILY MEDICINE

## 2020-11-12 PROCEDURE — G8427 DOCREV CUR MEDS BY ELIG CLIN: HCPCS | Performed by: FAMILY MEDICINE

## 2020-11-12 PROCEDURE — 1036F TOBACCO NON-USER: CPT | Performed by: FAMILY MEDICINE

## 2020-11-12 ASSESSMENT — ENCOUNTER SYMPTOMS
NAUSEA: 0
EYE DISCHARGE: 0
BLOOD IN STOOL: 0
ABDOMINAL DISTENTION: 0
ANAL BLEEDING: 0
BACK PAIN: 0
SINUS PRESSURE: 0
COLOR CHANGE: 0
TROUBLE SWALLOWING: 0
CHEST TIGHTNESS: 0
RECTAL PAIN: 0
EYE PAIN: 0
SHORTNESS OF BREATH: 0
COUGH: 0
CONSTIPATION: 0
VOMITING: 0
EYE REDNESS: 0
DIARRHEA: 0
ABDOMINAL PAIN: 0
VOICE CHANGE: 0

## 2020-11-12 ASSESSMENT — PATIENT HEALTH QUESTIONNAIRE - PHQ9
2. FEELING DOWN, DEPRESSED OR HOPELESS: 0
1. LITTLE INTEREST OR PLEASURE IN DOING THINGS: 0
SUM OF ALL RESPONSES TO PHQ QUESTIONS 1-9: 0
SUM OF ALL RESPONSES TO PHQ QUESTIONS 1-9: 0
SUM OF ALL RESPONSES TO PHQ9 QUESTIONS 1 & 2: 0
SUM OF ALL RESPONSES TO PHQ QUESTIONS 1-9: 0

## 2020-11-12 NOTE — PROGRESS NOTES
Subjective:      Patient ID: Jaylon Capellan is a 27 y.o. female. HPI Here for follow up of facial hair, questions about poss high testosterone. Also lightheadedness, labs show normal Thyroid, BS, Lipids, vitamins as well as renal and liver function. She has constipation on and off, but currently doing ok. States busy at work so sleep poor due to finals week. Mood , anxiety are ok as well at this time. Review of Systems   Constitutional: Negative for activity change, appetite change and fatigue. HENT: Negative for dental problem, ear pain, hearing loss, postnasal drip, sinus pressure, sneezing, tinnitus, trouble swallowing and voice change. Eyes: Negative for pain, discharge, redness and visual disturbance. Respiratory: Negative for cough, chest tightness and shortness of breath. Cardiovascular: Negative for chest pain, palpitations and leg swelling. Gastrointestinal: Negative for abdominal distention, abdominal pain, anal bleeding, blood in stool, constipation, diarrhea, nausea, rectal pain and vomiting. Endocrine: Negative for cold intolerance, heat intolerance, polydipsia, polyphagia and polyuria. Genitourinary: Negative for decreased urine volume, difficulty urinating, dyspareunia, dysuria, enuresis, flank pain, frequency, genital sores, hematuria, menstrual problem, pelvic pain, urgency, vaginal bleeding and vaginal discharge. Musculoskeletal: Negative for arthralgias, back pain, gait problem, joint swelling, myalgias, neck pain and neck stiffness. Skin: Negative for color change, pallor and rash. Allergic/Immunologic: Negative for environmental allergies, food allergies and immunocompromised state. Neurological: Negative for dizziness, tremors, seizures, syncope, facial asymmetry, speech difficulty, weakness, light-headedness, numbness and headaches. Hematological: Negative for adenopathy. Does not bruise/bleed easily.    Psychiatric/Behavioral: Negative for agitation,

## 2020-12-22 ENCOUNTER — TELEPHONE (OUTPATIENT)
Dept: FAMILY MEDICINE CLINIC | Age: 30
End: 2020-12-22

## 2021-04-07 ENCOUNTER — OFFICE VISIT (OUTPATIENT)
Dept: DERMATOLOGY | Age: 31
End: 2021-04-07
Payer: MEDICAID

## 2021-04-07 ENCOUNTER — HOSPITAL ENCOUNTER (OUTPATIENT)
Age: 31
Setting detail: SPECIMEN
Discharge: HOME | End: 2021-04-07
Payer: MEDICAID

## 2021-04-07 VITALS
SYSTOLIC BLOOD PRESSURE: 113 MMHG | WEIGHT: 102.8 LBS | BODY MASS INDEX: 17.55 KG/M2 | DIASTOLIC BLOOD PRESSURE: 80 MMHG | OXYGEN SATURATION: 98 % | TEMPERATURE: 97.9 F | HEIGHT: 64 IN | HEART RATE: 79 BPM

## 2021-04-07 DIAGNOSIS — L65.9 ALOPECIA: Primary | ICD-10-CM

## 2021-04-07 PROCEDURE — 11104 PUNCH BX SKIN SINGLE LESION: CPT | Performed by: DERMATOLOGY

## 2021-04-07 RX ORDER — LIDOCAINE HYDROCHLORIDE AND EPINEPHRINE 10; 10 MG/ML; UG/ML
0.5 INJECTION, SOLUTION INFILTRATION; PERINEURAL ONCE
Status: SHIPPED | OUTPATIENT
Start: 2021-04-07

## 2021-04-07 NOTE — PATIENT INSTRUCTIONS
[facility]     BIOPSY WOUND CARE    A biopsy is where a small piece of skin tissue is removed and examined by a pathologist.  When a biopsy is done, there is a small wound site that requires proper care to prevent infection and scarring. Some biopsies require sutures and their removal.    How to Care for Biopsy Wound    A.  Leave band-aid or dressing on for 24 hours. B. Wash two times a day with soap and water. C.  Let the wound air dry, then apply Vaseline ointment and cover with a Band-Aid       unless otherwise instructed by your provider. D. If there is slight discomfort, you may give acetaminophen or ibuprofen. When To Call the Doctor    Call the Dermatology Clinic or your doctor if any of the following occur:    A. Redness and swelling  B. Tenderness and warm to touch  C.  Drainage from wound  D. Fever    Biopsy Results    Biopsy results are usually available in 1-2 weeks. We provide biopsy results in letters for begin results or we will call for any concerning results. If you have not heard from our staff please call the office within 2 weeks. Please call our office with any concerns at [phone].

## 2021-04-12 LAB — DERMATOLOGY PATHOLOGY REPORT: NORMAL

## 2021-04-14 ENCOUNTER — NURSE ONLY (OUTPATIENT)
Dept: DERMATOLOGY | Age: 31
End: 2021-04-14

## 2021-04-14 VITALS — TEMPERATURE: 98.1 F

## 2021-04-14 DIAGNOSIS — Z48.02 VISIT FOR SUTURE REMOVAL: Primary | ICD-10-CM

## 2021-04-14 PROCEDURE — 99024 POSTOP FOLLOW-UP VISIT: CPT | Performed by: DERMATOLOGY

## 2021-04-20 ENCOUNTER — TELEPHONE (OUTPATIENT)
Dept: DERMATOLOGY | Age: 31
End: 2021-04-20

## 2021-04-20 DIAGNOSIS — L66.1 LICHEN PLANOPILARIS: Primary | ICD-10-CM

## 2021-04-20 RX ORDER — CLOBETASOL PROPIONATE 0.5 MG/G
OINTMENT TOPICAL
Qty: 60 G | Refills: 2 | Status: SHIPPED | OUTPATIENT
Start: 2021-04-20 | End: 2021-05-19

## 2021-04-20 NOTE — PROGRESS NOTES
Damián Cardenas presented for suture removal on the scalp. The biopsy site was well healed. The suture was removed and vaseline applied. The patient left in good condition.

## 2021-05-19 ENCOUNTER — OFFICE VISIT (OUTPATIENT)
Dept: DERMATOLOGY | Age: 31
End: 2021-05-19
Payer: MEDICAID

## 2021-05-19 VITALS
OXYGEN SATURATION: 98 % | BODY MASS INDEX: 19.63 KG/M2 | HEIGHT: 61 IN | TEMPERATURE: 97.5 F | HEART RATE: 100 BPM | SYSTOLIC BLOOD PRESSURE: 118 MMHG | DIASTOLIC BLOOD PRESSURE: 80 MMHG | WEIGHT: 104 LBS

## 2021-05-19 DIAGNOSIS — L66.1 LICHEN PLANOPILARIS: Primary | ICD-10-CM

## 2021-05-19 PROCEDURE — G8427 DOCREV CUR MEDS BY ELIG CLIN: HCPCS | Performed by: DERMATOLOGY

## 2021-05-19 PROCEDURE — 1036F TOBACCO NON-USER: CPT | Performed by: DERMATOLOGY

## 2021-05-19 PROCEDURE — 99214 OFFICE O/P EST MOD 30 MIN: CPT | Performed by: DERMATOLOGY

## 2021-05-19 PROCEDURE — 11900 INJECT SKIN LESIONS </W 7: CPT | Performed by: DERMATOLOGY

## 2021-05-19 PROCEDURE — G8420 CALC BMI NORM PARAMETERS: HCPCS | Performed by: DERMATOLOGY

## 2021-05-19 NOTE — PATIENT INSTRUCTIONS
- Start using Rogaine-5% (mens strength) to the scalp daily  - Decrease clobetasol solution to once daily     HYDROXYCHLOROQUINE      What is hydroxychloroquine and how does it work? Hydroxychloroquine is one of several antimalarial drugs that have anti-inflammatory effects useful in other diseases. It is licensed in [place] for the treatment of malaria. Hydroxychloroquine is particularly effective for systemic lupus erythematosus (SLE) and discoid lupus erythematosus (DLE). By reducing inflammation, hydroxychloroquine can decrease pain, swelling and stiffness of joints, and improve or clear some rashes. Which skin conditions are treated with hydroxychloroquine? These include:    Various forms of lupus erythematosus    Sarcoidosis    Porphyria cutanea tarda    Skin disorders caused or aggravated by the sun (photosensitive skin disorders)    Granuloma annulare    Lichen planus    Urticaria vasculitis    Sarcoidosis    How long will I need to take hydroxychloroquine before I see an effect? Hydroxychloroquine does not work immediately and it may be 12 weeks or longer before any benefit is noted. When should I take hydroxychloroquine? You should take hydroxychloroquine with or immediately after food. What dose should I take? Your doctor will advise you about this. Usually you will be started on a full dose (for example, 400 mg daily or 2 tablets of 200mg hydroxychloroquine) and later your doctor may reduce the dose (for example, to 200 mg daily of hydroxychloroquine). Some patients may only need to take hydroxychloroquine two or three times per week when their disease is being well controlled. What are the possible side effects of hydroxychloroquine? Side effects are uncommon; however, a few people may develop one of the following: rash, indigestion, diarrhoea, headache, blurred vision, cramps or muscle weakness, darkening of the skin, or bleaching of the hair.    Hydroxychloroquine can aggravate pre-existing psoriasis. Does hydroxychloroquine affect fertility or pregnancy? Considering the risks and benefits of taking hydroxychloroquine during pregnancy and breastfeeding, a systematic review and analysis of medical publications did not show any increased risk of death or defects in the fetus, spontaneous abortions, premature labour, or increased risk of stillbirth in patients with autoimmune diseases. How will I be monitored for the side effects of hydroxychloroquine treatment? Before starting on hydroxychloroquine your doctor may wish to carry out a blood test to check that your liver and kidneys are working normally. It should not be necessary to carry out regular blood tests. Your doctor should also enquire about any visual problems you may have. You should have your vision checked within the first year of starting the medication. Once you've been on the medication for 5 years, you should have your vision checked once yearly. Are there any other side effects if hydroxychloroquine is taken for a long time? Very rarely, high doses of hydroxychloroquine may damage the retina of the eye (the layer of cells in the back of the eye that detects light and allows you to see). This possible damage is prevented by keeping the dose low. May I drink alcohol while taking hydroxychloroquine? There is no particular reason for you to avoid alcohol while taking hydroxychloroquine, although it is advisable to adopt sensible drinking habits in line with guidelines. Can I take other medicines at the same time as hydroxychloroquine? Most other drugs can be taken safely with hydroxychloroquine. There are important interactions with Amiodarone and Digoxin (taken for heart disease) and with drugs used for epilepsy. However, if you start any new drugs, you should remind the doctor that you are already taking hydroxychloroquine.      Indigestion remedies, including some that are sold over the counter, can stop hydroxychloroquine from being absorbed. Always discuss other medications with your doctor or pharmacist before taking them. Where can I find out more about hydroxychloroquine?    If you want to know more about hydroxychloroquine, or if you are worried about your treatment, you should speak to your doctor or pharmacist.

## 2021-05-19 NOTE — PROGRESS NOTES
Dermatology Patient Note  [facility] [address]  [address]  [address]  Dept: [phone]  Dept Fax: [phone]      VISITDATE: 5/19/2021   REFERRING PROVIDER: No ref. provider found      Dallas Simms is a 32 y.o. female  who presents today in the office for:    Follow-up (1 month follow up on alopecia- still using the clobetasol solution and the lumps/bumps and scalp are much less sensitive, no hair growth yet but scalp is feeling better)      PERTINENT HISTORY NOT LISTED ABOVE:  Patient presents for f/u lichen planopilaris  - has been using clobetasol soln twice daily with reduction in inflammation, pain, and itching, but no increase in hair growth    CURRENT MEDICATIONS:   Current Outpatient Medications   Medication Sig Dispense Refill    clobetasol (TEMOVATE) 0.05 % external solution Apply topically 1-2 times daily 50 mL 2    busPIRone (BUSPAR) 10 MG tablet Take 1 tablet by mouth 3 times daily as needed (anxiety) 30 tablet 0    metFORMIN (GLUCOPHAGE) 500 MG tablet Take 1 tablet by mouth 2 times daily (with meals) (Patient taking differently: Take 500 mg by mouth daily (with breakfast) ) 60 tablet 3     Current Facility-Administered Medications   Medication Dose Route Frequency Provider Last Rate Last Admin    triamcinolone acetonide (KENALOG) injection 10 mg  10 mg Intra-Lesional Once Jac Luna MD        lidocaine-EPINEPHrine 1 %-1:866038 injection 0.5 mL  0.5 mL Intradermal Once Jac Luna MD           ALLERGIES:   No Known Allergies    SOCIAL HISTORY:  Social History     Tobacco Use    Smoking status: Never Smoker    Smokeless tobacco: Never Used   Substance Use Topics    Alcohol use: No       Pertinent ROS:  Review of Systems  Skin: Denies any new changing, growing or bleeding lesions or rashes except as described in the HPI   Constitutional: Denies fevers, chills, and malaise.     PHYSICAL EXAM:   /80 (Site: Right Upper Arm, Position: Sitting, Cuff Size: Medium Adult)   Pulse 100   Temp 97.5 °F (36.4 °C)   Ht 5' 1\" (1.549 m)   Wt 104 lb (47.2 kg)   LMP 04/21/2021   SpO2 98%   BMI 19.65 kg/m²     The patient is generally well appearing, well nourished, alert and conversational. Affect is normal.    Cutaneous Exam:  Physical Exam  Sun-exposed skin, which includes the head/face, neck, both arms, digits and/or nails was examined. Diagnoses/exam findings/medical history pertinent to this visit are listed below:    Assessment and Plan:  Assessment   1. Lichen planopilaris  - chronic illness, responding to treatment but not yet at goal  Intralesional Injection: After discussion of risks including atrophy and dyspigmentation, patient gives verbal consent to proceed. After cleansing with alcohol the lichen planopilaris patches on the scalp were injected with 1 ml of Kenalog 10  - triamcinolone acetonide (KENALOG) injection 10 mg  - INJECTION INTO SKIN LESIONS, UP TO 7  - clobetasol (TEMOVATE) 0.05 % external solution; Apply topically 1-2 times daily  Dispense: 50 mL; Refill: 2  - discussed adding plaquenil, discussed risks including retinopathy, gi upset, elevated LFTs  - she will think about it  - start Minoxidil 5% 1-2 daily. Counseled appropriate use. Will take up to 6 months to see results. May have initial telogen effluvium. Results will be reversed if use is discontinued. Switch to women's strength (2%) if increased facial hair growth noted. RTC 6 weeks    Patient Instructions   - Start using Rogaine-5% (mens strength) to the scalp daily  - Decrease clobetasol solution to once daily     HYDROXYCHLOROQUINE      What is hydroxychloroquine and how does it work? Hydroxychloroquine is one of several antimalarial drugs that have anti-inflammatory effects useful in other diseases. It is licensed in [place] for the treatment of malaria.    Hydroxychloroquine is particularly effective for systemic lupus erythematosus (SLE) and discoid lupus erythematosus (DLE). By reducing inflammation, hydroxychloroquine can decrease pain, swelling and stiffness of joints, and improve or clear some rashes. Which skin conditions are treated with hydroxychloroquine? These include:    Various forms of lupus erythematosus    Sarcoidosis    Porphyria cutanea tarda    Skin disorders caused or aggravated by the sun (photosensitive skin disorders)    Granuloma annulare    Lichen planus    Urticaria vasculitis    Sarcoidosis    How long will I need to take hydroxychloroquine before I see an effect? Hydroxychloroquine does not work immediately and it may be 12 weeks or longer before any benefit is noted. When should I take hydroxychloroquine? You should take hydroxychloroquine with or immediately after food. What dose should I take? Your doctor will advise you about this. Usually you will be started on a full dose (for example, 400 mg daily or 2 tablets of 200mg hydroxychloroquine) and later your doctor may reduce the dose (for example, to 200 mg daily of hydroxychloroquine). Some patients may only need to take hydroxychloroquine two or three times per week when their disease is being well controlled. What are the possible side effects of hydroxychloroquine? Side effects are uncommon; however, a few people may develop one of the following: rash, indigestion, diarrhoea, headache, blurred vision, cramps or muscle weakness, darkening of the skin, or bleaching of the hair. Hydroxychloroquine can aggravate pre-existing psoriasis. Does hydroxychloroquine affect fertility or pregnancy? Considering the risks and benefits of taking hydroxychloroquine during pregnancy and breastfeeding, a systematic review and analysis of medical publications did not show any increased risk of death or defects in the fetus, spontaneous abortions, premature labour, or increased risk of stillbirth in patients with autoimmune diseases.      How will I be monitored for the side effects of hydroxychloroquine treatment? Before starting on hydroxychloroquine your doctor may wish to carry out a blood test to check that your liver and kidneys are working normally. It should not be necessary to carry out regular blood tests. Your doctor should also enquire about any visual problems you may have. You should have your vision checked within the first year of starting the medication. Once you've been on the medication for 5 years, you should have your vision checked once yearly. Are there any other side effects if hydroxychloroquine is taken for a long time? Very rarely, high doses of hydroxychloroquine may damage the retina of the eye (the layer of cells in the back of the eye that detects light and allows you to see). This possible damage is prevented by keeping the dose low. May I drink alcohol while taking hydroxychloroquine? There is no particular reason for you to avoid alcohol while taking hydroxychloroquine, although it is advisable to adopt sensible drinking habits in line with guidelines. Can I take other medicines at the same time as hydroxychloroquine? Most other drugs can be taken safely with hydroxychloroquine. There are important interactions with Amiodarone and Digoxin (taken for heart disease) and with drugs used for epilepsy. However, if you start any new drugs, you should remind the doctor that you are already taking hydroxychloroquine. Indigestion remedies, including some that are sold over the counter, can stop hydroxychloroquine from being absorbed. Always discuss other medications with your doctor or pharmacist before taking them. Where can I find out more about hydroxychloroquine?    If you want to know more about hydroxychloroquine, or if you are worried about your treatment, you should speak to your doctor or pharmacist.         This note was created with the assistance of a speech-recognition program.  Although the intention is to generate a document that actually reflects the content of the visit, no guarantees can be provided that every mistake has been identified and corrected byediting.     Electronically signed by Hattie Mancia MD on 5/19/21 at 2:30 PM EDT

## 2021-05-20 RX ORDER — CLOBETASOL PROPIONATE 0.46 MG/ML
SOLUTION TOPICAL
Qty: 50 ML | Refills: 2 | Status: SHIPPED | OUTPATIENT
Start: 2021-05-20 | End: 2022-03-23

## 2021-05-28 ENCOUNTER — NURSE TRIAGE (OUTPATIENT)
Dept: OTHER | Facility: CLINIC | Age: 31
End: 2021-05-28

## 2021-05-28 NOTE — TELEPHONE ENCOUNTER
Reason for Disposition   Patient wants to be seen    Answer Assessment - Initial Assessment Questions  1. MECHANISM: \"How did the injury happen? \" (Consider the possibility of domestic violence or elder abuse)      Missed last few steps and landed on back and head. \"I think I landed mostly on my back\"    2. ONSET: \"When did the injury happen? \" (Minutes or hours ago)      3 wks ago    3. LOCATION: \"What part of the back is injured? \"      Left lower back    4. SEVERITY: \"Can you move the back normally? \"      Yes    5. PAIN: \"Is there any pain? \" If so, ask: \"How bad is the pain? \"   (Scale 1-10; or mild, moderate, severe)      5/10 at worst (walking, turning torso.) Crampy and stiff. 6. CORD SYMPTOMS: Any weakness or numbness of the arms or legs? \"      No    7. SIZE: For cuts, bruises, or swelling, ask: \"How large is it? \" (e.g., inches or centimeters)      Bruise left lower buttocks. 8. TETANUS: For any breaks in the skin, ask: \"When was the last tetanus booster? \"      N/a    9. OTHER SYMPTOMS: \"Do you have any other symptoms? \" (e.g., abdominal pain, blood in urine)      No    10. PREGNANCY: \"Is there any chance you are pregnant? \" \"When was your last menstrual period? \"        No    Protocols used: BACK INJURY-ADULT-[place]    Received call from Saul at [facility] with [employer]. Brief description of triage:  Increasing left lower back and buttock pain after a fall 3 weeks ago. Triage indicates for patient to be seen in the office. Care advice provided, patient verbalizes understanding; denies any other questions or concerns; instructed to call back for any new or worsening symptoms. Writer provided warm transfer to [place] at [facility] for appointment scheduling. Attention Provider: Thank you for allowing me to participate in the care of your patient. The patient was connected to triage in response to information provided to the [facility].   Please do not respond through

## 2021-07-13 ENCOUNTER — OFFICE VISIT (OUTPATIENT)
Dept: DERMATOLOGY | Age: 31
End: 2021-07-13
Payer: MEDICAID

## 2021-07-13 VITALS
OXYGEN SATURATION: 100 % | WEIGHT: 104.2 LBS | SYSTOLIC BLOOD PRESSURE: 111 MMHG | TEMPERATURE: 97.4 F | HEIGHT: 61 IN | HEART RATE: 83 BPM | BODY MASS INDEX: 19.67 KG/M2 | DIASTOLIC BLOOD PRESSURE: 76 MMHG

## 2021-07-13 DIAGNOSIS — L29.9 SCALP PRURITUS: ICD-10-CM

## 2021-07-13 DIAGNOSIS — L66.1 LICHEN PLANOPILARIS: ICD-10-CM

## 2021-07-13 DIAGNOSIS — L65.9 ALOPECIA: Primary | ICD-10-CM

## 2021-07-13 PROCEDURE — G8427 DOCREV CUR MEDS BY ELIG CLIN: HCPCS | Performed by: DERMATOLOGY

## 2021-07-13 PROCEDURE — 1036F TOBACCO NON-USER: CPT | Performed by: DERMATOLOGY

## 2021-07-13 PROCEDURE — G8420 CALC BMI NORM PARAMETERS: HCPCS | Performed by: DERMATOLOGY

## 2021-07-13 PROCEDURE — 99214 OFFICE O/P EST MOD 30 MIN: CPT | Performed by: DERMATOLOGY

## 2021-07-13 PROCEDURE — 11900 INJECT SKIN LESIONS </W 7: CPT | Performed by: DERMATOLOGY

## 2021-07-13 RX ORDER — IVERMECTIN 3 MG/1
200 TABLET ORAL WEEKLY
Qty: 6 TABLET | Refills: 0 | Status: SHIPPED | OUTPATIENT
Start: 2021-07-13 | End: 2021-07-21

## 2021-07-13 NOTE — PATIENT INSTRUCTIONS
-Consider Plaquenil   -Consider Ivermectin take 3 tablets (all 3 tablets at once) today on week one, take 3 tablets (all at once)one week later.   -Follow up: Call for follow up      Lichen Planopilaris  The cause of lichen planopilaris is unknown.  It is thought to be an autoimmune disorder in which white blood cells attack and destroy skin and hair cells

## 2021-07-13 NOTE — PROGRESS NOTES
Dermatology Patient Note  [facility] [address]  [address]  [address]  Dept: [phone]  Dept Fax: [phone]      VISITDATE: 7/13/2021   REFERRING PROVIDER: No ref. provider found      Meseret Sood is a 32 y.o. female  who presents today in the office for:    Follow-up (ILK inj- scalp)      HISTORY OF PRESENT ILLNESS:  Patient presents for f/u LPP  Thinks it has gotten worse  Very tender scalp especially bitemporally and at vertex  Using clobetasol solution daily  Wants to know what is causing this. Is there something in scalp? Lice?   Thought she noticed a few regrowing hairs on crown after last injection, but overall she is worse    MEDICAL PROBLEMS:  Patient Active Problem List    Diagnosis Date Noted    PCOS (polycystic ovarian syndrome) 10/14/2020    Bloating 10/14/2020    Other constipation 10/14/2020     Feels constipated when eating 3 meals a day      Lightheadedness 10/14/2020     Periodic        Abnormal facial hair 09/10/2019    History of heat urticaria 02/25/2019    Sensitivity to the cold 02/25/2019    OLIVIA (generalized anxiety disorder) 05/16/2014    Photodermatitis or photosensitivity 05/16/2014     Gets hives on exposure to sun         CURRENT MEDICATIONS:   Current Outpatient Medications   Medication Sig Dispense Refill    ivermectin 3 MG tablet Take 3 tablets by mouth once a week for 2 doses 6 tablet 0    clobetasol (TEMOVATE) 0.05 % external solution Apply topically 1-2 times daily 50 mL 2    busPIRone (BUSPAR) 10 MG tablet Take 1 tablet by mouth 3 times daily as needed (anxiety) 30 tablet 0    metFORMIN (GLUCOPHAGE) 500 MG tablet Take 1 tablet by mouth 2 times daily (with meals) (Patient taking differently: Take 500 mg by mouth daily (with breakfast) ) 60 tablet 3     Current Facility-Administered Medications   Medication Dose Route Frequency Provider Last Rate Last Admin    triamcinolone acetonide (KENALOG) injection 10 mg  10 mg Intra-Lesional Once Kp Contreras MD        lidocaine-EPINEPHrine 1 %-1:546163 injection 0.5 mL  0.5 mL Intradermal Once Kp Contreras MD           ALLERGIES:   No Known Allergies    SOCIAL HISTORY:  Social History     Tobacco Use    Smoking status: Never Smoker    Smokeless tobacco: Never Used   Substance Use Topics    Alcohol use: No       Pertinent ROS:  Review of Systems  Skin: Denies any new changing, growing or bleeding lesions or rashes except as described in the HPI   Constitutional: Denies fevers, chills, and malaise. PHYSICAL EXAM:   /76 (Site: Left Upper Arm, Position: Sitting, Cuff Size: Medium Adult)   Pulse 83   Temp 97.4 °F (36.3 °C)   Ht 5' 1\" (1.549 m)   Wt 104 lb 3.2 oz (47.3 kg)   LMP 06/30/2021   SpO2 100%   BMI 19.69 kg/m²     The patient is generally well appearing, well nourished, alert and conversational. Affect is normal.    Cutaneous Exam:  Physical Exam  Focused exam of scalp was performed    Facial covering was not removed during examination. Diagnoses/exam findings/medical history pertinent to this visit are listed below:    Assessment:   Diagnosis Orders   1. Alopecia     2. Lichen planopilaris  triamcinolone acetonide (KENALOG) injection 1 mg    INJECTION INTO SKIN LESIONS, UP TO 7   3. Scalp pruritus  ivermectin 3 MG tablet        Plan:  Lichen planopilaris, biopsy supported  - chronic illness with progression and/or exacerbation  - active inflammation on bilateral temples and vertex  - long discussion with patient about this disease. Explained that it has an unknown cause and there is no cure. Sometimes it goes into remission on its own, sometimes it progresses  - she is nervous about using clobetasol solution daily, but continues to use it. She still does not want to take plaquenil due to fear of retinal toxicity. I discussed that this is relatively low risk and easily monitored by ophthalmology.  She still declines this treatment and requests alternatives  - briefly discussed immunosuppressive therapy, patient is not interested  - I have occasionally seen patients have scalp pruritus due to demodex and they have had improvement with ivermectin. I offered this treatment with the understanding that it likely will not help since she has a known cause of scalp pruritus, LPP.  - ivermectin 3 MG tablet; Take 3 tablets by mouth once a week for 2 doses  Dispense: 6 tablet; Refill: 0  - will look into alternative treatments and contact her via MyChart  Intralesional Injection: After discussion of risks including atrophy and dyspigmentation, patient gives verbal consent to proceed. After cleansing with alcohol the LPP on the scalp were injected with 1 ml of Kenalog 10 mg/mL     RTC 6-8 weeks, patient wants to call for f/u    No future appointments. Patient Instructions   -Consider Plaquenil   -Consider Ivermectin take 3 tablets (all 3 tablets at once) today on week one, take 3 tablets (all at once)one week later.   -Follow up: Call for follow up      Lichen Planopilaris  The cause of lichen planopilaris is unknown. It is thought to be an autoimmune disorder in which white blood cells attack and destroy skin and hair cells      This note was created with the assistance of a speech-recognition program.  Although the intention is to generate a document that actually reflects the content of the visit, no guarantees can be provided that every mistake has been identified and corrected by editing.     Electronically signed by Danny Araujo MD on 7/13/21 at 8:16 AM BILLT

## 2021-07-15 ENCOUNTER — PATIENT MESSAGE (OUTPATIENT)
Dept: DERMATOLOGY | Age: 31
End: 2021-07-15

## 2021-07-15 NOTE — TELEPHONE ENCOUNTER
Hi Dr. Collette Nakai recently noticed a couple of new small bald patches on my scalp, that are indented. I wanted to know if the injections could have caused this.      Thank you,  Devin Nathan

## 2021-08-03 NOTE — TELEPHONE ENCOUNTER
Left message asking patient to check her MyChart to read Dr. Eddie Cooney options for treatment & to let us know what she would like to pursue.

## 2021-08-03 NOTE — TELEPHONE ENCOUNTER
Please inform patient that I sent her a long "[employer] message 2 weeks ago with options for treatment, and ask her to read it and get back to me (it has not been read)

## 2021-09-16 ENCOUNTER — PATIENT MESSAGE (OUTPATIENT)
Dept: DERMATOLOGY | Age: 31
End: 2021-09-16

## 2021-09-16 DIAGNOSIS — L66.1 LICHEN PLANOPILARIS: Primary | ICD-10-CM

## 2021-09-16 NOTE — TELEPHONE ENCOUNTER
From: Tom Solis  To: Gloria Bruce MD  Sent: 9/16/2021 5:04 AM EDT  Subject: Visit Adryan Butterfield,  sorry for responding so late to your message. [place]. Thank you so much for looking up other options for me. I would like to try the examiner laser and the platelet rich plasma. Also, I would like to continue the injections if possible. Is it possible to schedule an appointment online, so that I can see you when I come back?     Thank you,  Libia Tom

## 2021-09-16 NOTE — TELEPHONE ENCOUNTER
Please call [facility] and make sure they still have an Excimer laser before I refer. Ok to schedule patient for [facility] does PRP.

## 2021-09-17 NOTE — TELEPHONE ENCOUNTER
Kwaku Deluna-    We do not currently have online scheduling capabilities. Dr. Nury Christina found the Excimer laser at [facility]. I am going to send a referral to Dr. Layo Krishna. His phone number is [phone]. Platelet-rich plasma (PRP) therapy is done locally at [facility]. Their phone number is [phone].

## 2021-09-23 ENCOUNTER — TELEPHONE (OUTPATIENT)
Dept: DERMATOLOGY | Age: 31
End: 2021-09-23

## 2021-09-23 NOTE — TELEPHONE ENCOUNTER
Fax received from [facility] that they have attempted to contact the patient and are unable to reach her.

## 2022-03-23 ENCOUNTER — OFFICE VISIT (OUTPATIENT)
Dept: FAMILY MEDICINE CLINIC | Age: 32
End: 2022-03-23
Payer: MEDICAID

## 2022-03-23 VITALS
HEART RATE: 108 BPM | OXYGEN SATURATION: 98 % | SYSTOLIC BLOOD PRESSURE: 113 MMHG | TEMPERATURE: 96.6 F | DIASTOLIC BLOOD PRESSURE: 75 MMHG | BODY MASS INDEX: 20.43 KG/M2 | WEIGHT: 108.2 LBS | HEIGHT: 61 IN

## 2022-03-23 DIAGNOSIS — R73.9 HYPERGLYCEMIA: ICD-10-CM

## 2022-03-23 DIAGNOSIS — N92.6 MISSED PERIOD: ICD-10-CM

## 2022-03-23 DIAGNOSIS — Z12.4 PAP SMEAR FOR CERVICAL CANCER SCREENING: ICD-10-CM

## 2022-03-23 DIAGNOSIS — Z13.220 SCREENING, LIPID: ICD-10-CM

## 2022-03-23 DIAGNOSIS — Z13.29 SCREENING FOR THYROID DISORDER: Primary | ICD-10-CM

## 2022-03-23 DIAGNOSIS — Z11.59 NEED FOR HEPATITIS C SCREENING TEST: ICD-10-CM

## 2022-03-23 DIAGNOSIS — E53.9 VITAMIN B DEFICIENCY: ICD-10-CM

## 2022-03-23 DIAGNOSIS — E55.9 VITAMIN D DEFICIENCY: ICD-10-CM

## 2022-03-23 DIAGNOSIS — Z11.4 SCREENING FOR HIV (HUMAN IMMUNODEFICIENCY VIRUS): ICD-10-CM

## 2022-03-23 PROCEDURE — G8484 FLU IMMUNIZE NO ADMIN: HCPCS | Performed by: FAMILY MEDICINE

## 2022-03-23 PROCEDURE — 99214 OFFICE O/P EST MOD 30 MIN: CPT | Performed by: FAMILY MEDICINE

## 2022-03-23 PROCEDURE — 1036F TOBACCO NON-USER: CPT | Performed by: FAMILY MEDICINE

## 2022-03-23 PROCEDURE — G8427 DOCREV CUR MEDS BY ELIG CLIN: HCPCS | Performed by: FAMILY MEDICINE

## 2022-03-23 PROCEDURE — G8420 CALC BMI NORM PARAMETERS: HCPCS | Performed by: FAMILY MEDICINE

## 2022-03-23 SDOH — ECONOMIC STABILITY: FOOD INSECURITY: WITHIN THE PAST 12 MONTHS, YOU WORRIED THAT YOUR FOOD WOULD RUN OUT BEFORE YOU GOT MONEY TO BUY MORE.: NEVER TRUE

## 2022-03-23 SDOH — ECONOMIC STABILITY: FOOD INSECURITY: WITHIN THE PAST 12 MONTHS, THE FOOD YOU BOUGHT JUST DIDN'T LAST AND YOU DIDN'T HAVE MONEY TO GET MORE.: NEVER TRUE

## 2022-03-23 ASSESSMENT — PATIENT HEALTH QUESTIONNAIRE - PHQ9
SUM OF ALL RESPONSES TO PHQ QUESTIONS 1-9: 0
1. LITTLE INTEREST OR PLEASURE IN DOING THINGS: 0
SUM OF ALL RESPONSES TO PHQ QUESTIONS 1-9: 0
SUM OF ALL RESPONSES TO PHQ QUESTIONS 1-9: 0
SUM OF ALL RESPONSES TO PHQ9 QUESTIONS 1 & 2: 0
SUM OF ALL RESPONSES TO PHQ9 QUESTIONS 1 & 2: 0
SUM OF ALL RESPONSES TO PHQ QUESTIONS 1-9: 0
2. FEELING DOWN, DEPRESSED OR HOPELESS: 0
SUM OF ALL RESPONSES TO PHQ QUESTIONS 1-9: 0
2. FEELING DOWN, DEPRESSED OR HOPELESS: 0
1. LITTLE INTEREST OR PLEASURE IN DOING THINGS: 0

## 2022-03-23 ASSESSMENT — ENCOUNTER SYMPTOMS
ABDOMINAL PAIN: 0
RECTAL PAIN: 0
TROUBLE SWALLOWING: 0
NAUSEA: 1
COUGH: 0
VOMITING: 0
ABDOMINAL DISTENTION: 0
BLOOD IN STOOL: 0
SINUS PRESSURE: 0
EYE DISCHARGE: 0
SHORTNESS OF BREATH: 0
CHEST TIGHTNESS: 0
COLOR CHANGE: 0
CONSTIPATION: 0
EYE PAIN: 0
BACK PAIN: 0
DIARRHEA: 0
VOICE CHANGE: 0
ANAL BLEEDING: 0
EYE REDNESS: 0

## 2022-03-23 ASSESSMENT — SOCIAL DETERMINANTS OF HEALTH (SDOH)
HOW HARD IS IT FOR YOU TO PAY FOR THE VERY BASICS LIKE FOOD, HOUSING, MEDICAL CARE, AND HEATING?: NOT HARD AT ALL
HOW HARD IS IT FOR YOU TO PAY FOR THE VERY BASICS LIKE FOOD, HOUSING, MEDICAL CARE, AND HEATING?: NOT HARD AT ALL

## 2022-03-23 NOTE — PROGRESS NOTES
Subjective:      Patient ID: Jessica Martinez is a 28 y.o. female. States missed period this month - states periods are usual odor. States also having some heartburn on and off. States faint cramping in pelvic area R >L lasts 10 min - barely feels it. States had a home peg test that was faintly positive 4 days ago. States used to see ama pollack for [employer]. States drinking water. Review of Systems   Constitutional: Negative for activity change, appetite change and fatigue. HENT: Negative for dental problem, ear pain, hearing loss, postnasal drip, sinus pressure, sneezing, tinnitus, trouble swallowing and voice change. Eyes: Negative for pain, discharge, redness and visual disturbance. Respiratory: Negative for cough, chest tightness and shortness of breath. Cardiovascular: Negative for chest pain, palpitations and leg swelling. Gastrointestinal: Positive for nausea. Negative for abdominal distention, abdominal pain, anal bleeding, blood in stool, constipation, diarrhea, rectal pain and vomiting. One episode of nausea 2 days ago. Endocrine: Negative for cold intolerance, heat intolerance, polydipsia, polyphagia and polyuria. Genitourinary: Negative for decreased urine volume, difficulty urinating, dyspareunia, dysuria, enuresis, flank pain, frequency, genital sores, hematuria, menstrual problem, pelvic pain, urgency, vaginal bleeding and vaginal discharge. Musculoskeletal: Negative for arthralgias, back pain, gait problem, joint swelling, myalgias, neck pain and neck stiffness. Skin: Negative for color change, pallor and rash. Allergic/Immunologic: Negative for environmental allergies, food allergies and immunocompromised state. Neurological: Negative for dizziness, tremors, seizures, syncope, facial asymmetry, speech difficulty, weakness, light-headedness, numbness and headaches. Hematological: Negative for adenopathy. Does not bruise/bleed easily. Psychiatric/Behavioral: Negative for agitation, behavioral problems, confusion, decreased concentration, sleep disturbance and suicidal ideas. The patient is not nervous/anxious. Objective:   Physical Exam  Constitutional:       General: She is not in acute distress. HENT:      Head: Normocephalic and atraumatic. Right Ear: External ear normal.      Left Ear: External ear normal.      Nose: Nose normal.      Mouth/Throat:      Mouth: Mucous membranes are moist.      Pharynx: Oropharynx is clear. Eyes:      Conjunctiva/sclera: Conjunctivae normal.      Pupils: Pupils are equal, round, and reactive to light. Neck:      Thyroid: No thyromegaly. Trachea: No tracheal deviation. Cardiovascular:      Rate and Rhythm: Normal rate and regular rhythm. Pulses: Normal pulses. Heart sounds: No murmur heard. No friction rub. No gallop. Pulmonary:      Effort: Pulmonary effort is normal. No respiratory distress. Breath sounds: Normal breath sounds. No stridor. No wheezing or rales. Chest:      Chest wall: No tenderness. Abdominal:      General: Bowel sounds are normal. There is no distension. Palpations: Abdomen is soft. Tenderness: There is no abdominal tenderness. There is no rebound. Musculoskeletal:         General: Normal range of motion. Cervical back: Normal range of motion. Lymphadenopathy:      Cervical: No cervical adenopathy. Skin:     General: Skin is warm. Coloration: Skin is not pale. Findings: No erythema or rash. Neurological:      Mental Status: She is alert and oriented to person, place, and time. Mental status is at baseline. Cranial Nerves: No cranial nerve deficit. Motor: No abnormal muscle tone. Deep Tendon Reflexes: Reflexes normal.   Psychiatric:         Mood and Affect: Mood normal.         Behavior: Behavior normal.         Thought Content:  Thought content normal.         Judgment: Judgment normal. Vitals:    03/23/22 1428   BP: 113/75   Pulse: 108   Temp: 96.6 °F (35.9 °C)   SpO2: 98%         Assessment:      Diagnosis Orders   1. Screening for thyroid disorder     2. Screening, lipid     3. Vitamin D deficiency     4. Vitamin B deficiency     5. Screening for HIV (human immunodeficiency virus)     6. Need for hepatitis C screening test     7. Hyperglycemia     8. Pap smear for cervical cancer screening     9. Missed period           Plan:     Orders Placed This Encounter   Procedures    CBC    Comprehensive Metabolic Panel    Folate    Hemoglobin A1C    Hepatitis C Antibody    HIV Screen    Lipid Panel    T4, Free    TSH    Vitamin B12    Vitamin D 25 Hydroxy    hCG, Serum, Qualitative    Minerva Colon, Shama Mercado, [place], OB/[facility]N, [place]       Outpatient Encounter Medications as of 3/23/2022   Medication Sig Dispense Refill    [DISCONTINUED] clobetasol (TEMOVATE) 0.05 % external solution Apply topically 1-2 times daily (Patient not taking: Reported on 3/23/2022) 50 mL 2    [DISCONTINUED] busPIRone (BUSPAR) 10 MG tablet Take 1 tablet by mouth 3 times daily as needed (anxiety) (Patient not taking: Reported on 3/23/2022) 30 tablet 0    [DISCONTINUED] metFORMIN (GLUCOPHAGE) 500 MG tablet Take 1 tablet by mouth 2 times daily (with meals) (Patient not taking: Reported on 3/23/2022) 60 tablet 3     Facility-Administered Encounter Medications as of 3/23/2022   Medication Dose Route Frequency Provider Last Rate Last Admin    triamcinolone acetonide (KENALOG) injection 10 mg  10 mg Intra-LESional Once Mary Jo Degroot MD        lidocaine-EPINEPHrine 1 %-1:097960 injection 0.5 mL  0.5 mL IntraDERmal Once Scott Sears MD         20 minutes spent with patient counseling/educating on acute/chronic medical health problems, medications,  along with treatment options. Reviewed multiple labs/imaging/medications with patient during this time. Following Diet discussion/education was done on healthy labs. In addition Exercise plan and depression screen were discussed. Recent labs/imaging were discussed and reviewed with patient.

## 2022-03-24 LAB
ALBUMIN SERPL-MCNC: 4.6 G/DL
ALP BLD-CCNC: 48 U/L
ALT SERPL-CCNC: 10 U/L
ANION GAP SERPL CALCULATED.3IONS-SCNC: NORMAL MMOL/L
ANTIBODY: NORMAL
AST SERPL-CCNC: 23 U/L
AVERAGE GLUCOSE: 105
BASOPHILS ABSOLUTE: NORMAL
BASOPHILS RELATIVE PERCENT: NORMAL
BILIRUB SERPL-MCNC: 0.6 MG/DL (ref 0.1–1.4)
BUN BLDV-MCNC: 9 MG/DL
CALCIUM SERPL-MCNC: 9.1 MG/DL
CHLORIDE BLD-SCNC: 103 MMOL/L
CHOLESTEROL, TOTAL: 192 MG/DL
CHOLESTEROL/HDL RATIO: 3
CO2: 25 MMOL/L
CREAT SERPL-MCNC: 0.74 MG/DL
EOSINOPHILS ABSOLUTE: NORMAL
EOSINOPHILS RELATIVE PERCENT: NORMAL
GFR CALCULATED: NORMAL
GLUCOSE BLD-MCNC: 88 MG/DL
HBA1C MFR BLD: 5.3 %
HCT VFR BLD CALC: NORMAL %
HDLC SERPL-MCNC: 63 MG/DL (ref 35–70)
HEMOGLOBIN: NORMAL
HIV AG/AB: NORMAL
LDL CHOLESTEROL CALCULATED: 116 MG/DL (ref 0–160)
LYMPHOCYTES ABSOLUTE: NORMAL
LYMPHOCYTES RELATIVE PERCENT: NORMAL
MCH RBC QN AUTO: NORMAL PG
MCHC RBC AUTO-ENTMCNC: NORMAL G/DL
MCV RBC AUTO: NORMAL FL
MONOCYTES ABSOLUTE: NORMAL
MONOCYTES RELATIVE PERCENT: NORMAL
NEUTROPHILS ABSOLUTE: NORMAL
NEUTROPHILS RELATIVE PERCENT: NORMAL
NONHDLC SERPL-MCNC: NORMAL MG/DL
PLATELET # BLD: NORMAL 10*3/UL
PMV BLD AUTO: NORMAL FL
POTASSIUM SERPL-SCNC: 4.2 MMOL/L
RBC # BLD: NORMAL 10*6/UL
SODIUM BLD-SCNC: 138 MMOL/L
T4 FREE: NORMAL
TOTAL PROTEIN: 7.4
TRIGL SERPL-MCNC: 65 MG/DL
TSH SERPL DL<=0.05 MIU/L-ACNC: 1.33 UIU/ML
VITAMIN B-12: NORMAL
VITAMIN D 25-HYDROXY: NORMAL
VITAMIN D2, 25 HYDROXY: NORMAL
VITAMIN D3,25 HYDROXY: NORMAL
VLDLC SERPL CALC-MCNC: 13 MG/DL
WBC # BLD: NORMAL 10*3/UL

## 2022-03-25 ENCOUNTER — TELEPHONE (OUTPATIENT)
Dept: FAMILY MEDICINE CLINIC | Age: 32
End: 2022-03-25

## 2022-03-25 ENCOUNTER — TELEPHONE (OUTPATIENT)
Dept: OBGYN CLINIC | Age: 32
End: 2022-03-25

## 2022-03-25 NOTE — TELEPHONE ENCOUNTER
Message was given to pt she voiced understanding and she said her labs were done at [facility] lab not [address]

## 2022-03-25 NOTE — TELEPHONE ENCOUNTER
See OB or go to ER- I dont have any labs back in the computer- where did she do it- [facility]

## 2022-03-25 NOTE — TELEPHONE ENCOUNTER
I would go to [facility]'s ED to be evaluated.     DO Soraida Rivas Ob/Gyn   3/25/2022, 10:34 AM

## 2022-03-25 NOTE — TELEPHONE ENCOUNTER
Call from pt she had a pos home pregnancy on Monday last night she had cramping and vag bleeding should she come to see you or her OBGYN.  She is also asking for her lab results ankit at [facility] 3/24/22

## 2022-03-31 DIAGNOSIS — Z13.29 SCREENING FOR THYROID DISORDER: ICD-10-CM

## 2022-03-31 DIAGNOSIS — Z13.220 SCREENING, LIPID: ICD-10-CM

## 2022-03-31 DIAGNOSIS — E53.9 VITAMIN B DEFICIENCY: ICD-10-CM

## 2022-03-31 DIAGNOSIS — R73.9 HYPERGLYCEMIA: ICD-10-CM

## 2022-03-31 DIAGNOSIS — E55.9 VITAMIN D DEFICIENCY: ICD-10-CM

## 2022-03-31 DIAGNOSIS — N92.6 MISSED PERIOD: ICD-10-CM

## 2022-03-31 DIAGNOSIS — Z11.59 NEED FOR HEPATITIS C SCREENING TEST: ICD-10-CM

## 2022-03-31 DIAGNOSIS — Z11.4 SCREENING FOR HIV (HUMAN IMMUNODEFICIENCY VIRUS): ICD-10-CM

## 2022-04-07 ENCOUNTER — OFFICE VISIT (OUTPATIENT)
Dept: OBGYN CLINIC | Age: 32
End: 2022-04-07
Payer: MEDICAID

## 2022-04-07 ENCOUNTER — HOSPITAL ENCOUNTER (OUTPATIENT)
Age: 32
Setting detail: SPECIMEN
Discharge: HOME | End: 2022-04-07

## 2022-04-07 VITALS
WEIGHT: 111 LBS | BODY MASS INDEX: 20.96 KG/M2 | HEIGHT: 61 IN | HEART RATE: 95 BPM | SYSTOLIC BLOOD PRESSURE: 123 MMHG | DIASTOLIC BLOOD PRESSURE: 81 MMHG | OXYGEN SATURATION: 98 %

## 2022-04-07 DIAGNOSIS — E28.2 PCOS (POLYCYSTIC OVARIAN SYNDROME): ICD-10-CM

## 2022-04-07 DIAGNOSIS — N91.2 AMENORRHEA: ICD-10-CM

## 2022-04-07 DIAGNOSIS — Z31.69 ENCOUNTER FOR PRECONCEPTION CONSULTATION: Primary | ICD-10-CM

## 2022-04-07 DIAGNOSIS — N91.2 AMENORRHEA: Primary | ICD-10-CM

## 2022-04-07 LAB
ABSOLUTE EOS #: 0.11 K/UL (ref 0–0.44)
ABSOLUTE IMMATURE GRANULOCYTE: <0.03 K/UL (ref 0–0.3)
ABSOLUTE LYMPH #: 2.31 K/UL (ref 1.1–3.7)
ABSOLUTE MONO #: 0.58 K/UL (ref 0.1–1.2)
BASOPHILS # BLD: 1 % (ref 0–2)
BASOPHILS ABSOLUTE: 0.08 K/UL (ref 0–0.2)
EOSINOPHILS RELATIVE PERCENT: 2 % (ref 1–4)
HCG QUANTITATIVE: <1 MIU/ML
HCT VFR BLD CALC: 45.6 % (ref 36.3–47.1)
HEMOGLOBIN: 15 G/DL (ref 11.9–15.1)
IMMATURE GRANULOCYTES: 0 %
LYMPHOCYTES # BLD: 31 % (ref 24–43)
MCH RBC QN AUTO: 31.6 PG (ref 25.2–33.5)
MCHC RBC AUTO-ENTMCNC: 32.9 G/DL (ref 28.4–34.8)
MCV RBC AUTO: 96.2 FL (ref 82.6–102.9)
MONOCYTES # BLD: 8 % (ref 3–12)
NRBC AUTOMATED: 0 PER 100 WBC
PDW BLD-RTO: 11.8 % (ref 11.8–14.4)
PLATELET # BLD: 252 K/UL (ref 138–453)
PMV BLD AUTO: 10.6 FL (ref 8.1–13.5)
PROLACTIN: 17.43 NG/ML (ref 4.79–23.3)
RBC # BLD: 4.74 M/UL (ref 3.95–5.11)
SEG NEUTROPHILS: 58 % (ref 36–65)
SEGMENTED NEUTROPHILS ABSOLUTE COUNT: 4.43 K/UL (ref 1.5–8.1)
TSH SERPL DL<=0.05 MIU/L-ACNC: 1.93 UIU/ML (ref 0.3–5)
WBC # BLD: 7.5 K/UL (ref 3.5–11.3)

## 2022-04-07 PROCEDURE — G8420 CALC BMI NORM PARAMETERS: HCPCS | Performed by: STUDENT IN AN ORGANIZED HEALTH CARE EDUCATION/TRAINING PROGRAM

## 2022-04-07 PROCEDURE — 99213 OFFICE O/P EST LOW 20 MIN: CPT | Performed by: STUDENT IN AN ORGANIZED HEALTH CARE EDUCATION/TRAINING PROGRAM

## 2022-04-07 PROCEDURE — G8427 DOCREV CUR MEDS BY ELIG CLIN: HCPCS | Performed by: STUDENT IN AN ORGANIZED HEALTH CARE EDUCATION/TRAINING PROGRAM

## 2022-04-07 PROCEDURE — 1036F TOBACCO NON-USER: CPT | Performed by: STUDENT IN AN ORGANIZED HEALTH CARE EDUCATION/TRAINING PROGRAM

## 2022-04-07 NOTE — PROGRESS NOTES
[address] [facility]  [address] [facility], [address] [facility]      Problem Visit      Joann Ivey  2022                       Primary Care Physician: Yonis Child MD    CC:   Chief Complaint   Patient presents with    Amenorrhea     took at home preg test, showed a faint line - used to take metformin, after stopped takin periods have been regular, periods usually off by a day or two, recently has been off more like 5 days, did the HCG and showed she wasnt pregnant. then had bleeding w/ sever cramps and bleeding out of normal cramping    Results     at home test faint + 3-21 then bleeding w/ horrible cramps 3-25          HPI: Joann Ivey is a 28 y.o. female  Patient's last menstrual period was 2022 (approximate). The patient was seen and examined. She is here for irregular menses. Around the middle of March, the patient started noticing breast tenderness and feeling \"off. \"  Patient took a pregnancy test which was faintly positive. About 5 days later she went to her PCP who ordered a blood quantitative hCG which was negative. Patient then went on to have a very heavy and painful. About 10 days late. Counseled patient that this is indicative of a very early miscarriage. Patient is concerned that it could have been due to any genetic abnormalities. Her sister-in-law does have genetic abnormalities and she would like to have preconception counseling done [facility]. We will make referral today. Overall, her periods are regular and come roughly every 28 days. Patient was told in the past that she could have polycystic ovarian syndrome because her periods were irregular at that time. We will check some basic labs and do an ultrasound.       REVIEW OF SYSTEMS:  Constitutional: negative fever, negative chills  HEENT: negative visual disturbances, negative headaches  Respiratory: negative dyspnea, negative cough  Cardiovascular: negative chest pain,  negative palpitations  Gastrointestinal: negative abdominal pain, negative RUQ pain, negative N/V, negative diarrhea, negative constipation  Genitourinary: negative dysuria, negative vaginal discharge  Dermatological: negative rash  Hematologic: negative bruising  Immunologic/Lymphatic: negative recent illness, negative recent sick contact  Musculoskeletal: negative back pain, negative myalgias, negative arthralgias  Neurological:  negative dizziness, negative weakness  Behavior/Psych: negative depression, negative anxiety    OBSTETRICAL HISTORY:  OB History    Para Term  AB Living   0 0 0 0 0 0   SAB IAB Ectopic Molar Multiple Live Births   0 0 0 0 0 0       PAST MEDICAL HISTORY:      Diagnosis Date    Abnormal facial hair     Anxiety     Feeling faint     Homozygous MTHFR mutation [id number]    Irregular periods     they are regular     Panic disorder        PAST SURGICAL HISTORY:                                                                History reviewed. No pertinent surgical history.     MEDICATIONS:  Current Outpatient Medications   Medication Sig Dispense Refill    Multiple Vitamin (MULTI VITAMIN DAILY PO) Take by mouth       Current Facility-Administered Medications   Medication Dose Route Frequency Provider Last Rate Last Admin    triamcinolone acetonide (KENALOG) injection 10 mg  10 mg Intra-LESional Once Yemi Degroot MD        lidocaine-EPINEPHrine 1 %-1:084623 injection 0.5 mL  0.5 mL IntraDERmal Once Cha Velasquez MD           ALLERGIES:   Allergies as of 2022    (No Known Allergies)                                   VITALS:  Vitals:    22 0912   BP: 123/81   Site: Right Upper Arm   Position: Sitting   Cuff Size: Small Adult   Pulse: 95   SpO2: 98%   Weight: 111 lb (50.3 kg)   Height: 5' 1\" (1.549 m) PHYSICAL EXAM:   General Appearance: Appears healthy. Alert; in no acute distress. Pleasant. Skin: Skin color, texture, turgor normal. No rashes or lesions. HEENT: normocephalic and atraumatic, Thyroid normal to inspection and palpation  Respiratory: Normal expansion. Clear to auscultation. No rales, rhonchi, or wheezing. Cardiovascular: normal rate, normal S1 and S2, no gallops, intact distal pulses and no carotid bruits  Breast:  (Chest): N/A  Abdomen: soft, non-tender, non-distended, no right upper quadrant tenderness and no CVA tenderness  Pelvic Exam: N/A  Rectal Exam: exam declined by patient  Musculoskeletal: no gross abnormalities  Extremities: non-tender BLE and non-edematous  Psych:  oriented to time, place and person       DATA:  No results found for this visit on 04/07/22. ASSESSMENT & PLAN:    Elsie Rolle is a 28 y.o. female [place] Patient's last menstrual period was 03/25/2022 (approximate). Early Miscarriage   - CBC, TSH, HCG, PRL, GYN US pending   - Preconception counseling at [facility] recommending. Referral made. Patient Active Problem List    Diagnosis Date Noted    PCOS (polycystic ovarian syndrome) 10/14/2020    Bloating 10/14/2020    Other constipation 10/14/2020     Feels constipated when eating 3 meals a day      Lightheadedness 10/14/2020     Periodic        Abnormal facial hair 09/10/2019    History of heat urticaria 02/25/2019    Sensitivity to the cold 02/25/2019    OLIVIA (generalized anxiety disorder) 05/16/2014    Photodermatitis or photosensitivity 05/16/2014     Gets hives on exposure to sun         Return if symptoms worsen or fail to improve. Counseling Completed:    Discussed need for repeat pap as per American Society for Colposcopy and Cervical Pathology guidelines. Discussed need for mammograms every 1 year, If >44 yo and last mammogram was negative. Discussed Calcium and Vitamin D dosing.   Discussed need for colonoscopy screening as well as onset for bone density testing. Discussed birth control and barrier recommendations. Discussed STD counseling and prevention. Discussed Gardisil counseling for all patients 10-37 yo. Hereditary Breast, Ovarian, Colon and Uterine Cancer screening discussed. Tobacco & Secondary smoke risks discussed; with recommendation for cessation and avoidance. Routine health maintenance per patients PCP discussed. Patient was seen with total face to face time of 15 minutes. More than 50% of this visit was on counseling and education regarding her diagnose(s) as listed below and options. She was also counseled on her preventative health maintenance recommendations and follow-up. Diagnosis Orders   1.  Amenorrhea  CBC with Auto Differential    TSH with Reflex    HCG, Quantitative, Pregnancy    Prolactin    US PELVIS COMPLETE NON-OB TRANSABDOMINAL AND TRANSVAGINAL         Crystal Laura, [address] [facility]   4/7/2022, 10:10 AM

## 2022-04-22 ENCOUNTER — OFFICE VISIT (OUTPATIENT)
Dept: DERMATOLOGY | Age: 32
End: 2022-04-22
Payer: MEDICAID

## 2022-04-22 VITALS
HEIGHT: 61 IN | HEART RATE: 77 BPM | OXYGEN SATURATION: 97 % | BODY MASS INDEX: 20.58 KG/M2 | DIASTOLIC BLOOD PRESSURE: 79 MMHG | TEMPERATURE: 97.8 F | WEIGHT: 109 LBS | SYSTOLIC BLOOD PRESSURE: 113 MMHG

## 2022-04-22 DIAGNOSIS — L66.1 LICHEN PLANOPILARIS: Primary | ICD-10-CM

## 2022-04-22 PROCEDURE — G8427 DOCREV CUR MEDS BY ELIG CLIN: HCPCS | Performed by: PHYSICIAN ASSISTANT

## 2022-04-22 PROCEDURE — G8420 CALC BMI NORM PARAMETERS: HCPCS | Performed by: PHYSICIAN ASSISTANT

## 2022-04-22 PROCEDURE — 99213 OFFICE O/P EST LOW 20 MIN: CPT | Performed by: PHYSICIAN ASSISTANT

## 2022-04-22 PROCEDURE — 1036F TOBACCO NON-USER: CPT | Performed by: PHYSICIAN ASSISTANT

## 2022-04-22 RX ORDER — CLOBETASOL PROPIONATE 0.46 MG/ML
SOLUTION TOPICAL
Qty: 50 ML | Refills: 2 | Status: SHIPPED | OUTPATIENT
Start: 2022-04-22

## 2022-04-22 RX ORDER — DUTASTERIDE 0.5 MG/1
0.5 CAPSULE, LIQUID FILLED ORAL DAILY
Qty: 30 CAPSULE | Refills: 2 | Status: SHIPPED | OUTPATIENT
Start: 2022-04-22 | End: 2022-07-21

## 2022-04-22 NOTE — PROGRESS NOTES
Dermatology Patient Note  [address]  [address] [facility] [address]  Dept: [phone]  Dept Fax: [phone]      VISITDATE: 4/22/2022   REFERRING PROVIDER: No ref. provider found      Velia Cornell is a 28 y.o. female  who presents today in the office for:    Follow-up (Patient is here for a follow up on her lichen planopilars. States it is improving since she was last seen. Wants opinion if she needs more ILK inj. )      HISTORY OF PRESENT ILLNESS:  As above. Pt states that her scalp feels better. She has two areas (right and left of scalp vertex) that have very mild tenderness/burning symptoms. Notes some hair regrowth. Her last scalp injection was 4 months ago. MEDICAL PROBLEMS:  Patient Active Problem List    Diagnosis Date Noted    PCOS (polycystic ovarian syndrome) 10/14/2020    Bloating 10/14/2020    Other constipation 10/14/2020     Feels constipated when eating 3 meals a day      Lightheadedness 10/14/2020     Periodic        Abnormal facial hair 09/10/2019    History of heat urticaria 02/25/2019    Sensitivity to the cold 02/25/2019    OLIVIA (generalized anxiety disorder) 05/16/2014    Photodermatitis or photosensitivity 05/16/2014     Gets hives on exposure to sun         CURRENT MEDICATIONS:   Current Outpatient Medications   Medication Sig Dispense Refill    clobetasol (TEMOVATE) 0.05 % external solution Apply topically 2 times daily.  50 mL 2    dutasteride (AVODART) 0.5 MG capsule Take 1 capsule by mouth daily 30 capsule 2    Multiple Vitamin (MULTI VITAMIN DAILY PO) Take by mouth       Current Facility-Administered Medications   Medication Dose Route Frequency Provider Last Rate Last Admin    triamcinolone acetonide (KENALOG) injection 10 mg  10 mg Intra-LESional Once Jeff Jones MD        lidocaine-EPINEPHrine 1 %-1:817789 injection 0.5 mL  0.5 mL IntraDERmal Once Jeff Jones MD ALLERGIES:   No Known Allergies    SOCIAL HISTORY:  Social History     Tobacco Use    Smoking status: Never Smoker    Smokeless tobacco: Never Used   Substance Use Topics    Alcohol use: No       Pertinent ROS:  Review of Systems  Skin: Denies any new changing, growing or bleeding lesions or rashes except as described in the HPI   Constitutional: Denies fevers, chills, and malaise. PHYSICAL EXAM:   /79   Pulse 77   Temp 97.8 °F (36.6 °C)   Ht 5' 1\" (1.549 m)   Wt 109 lb (49.4 kg)   LMP 03/25/2022 (Approximate)   SpO2 97%   BMI 20.60 kg/m²     The patient is generally well appearing, well nourished, alert and conversational. Affect is normal.    Cutaneous Exam:  Physical Exam  Face, neck, and scalp were examined. Facial covering was not removed during examination. Diagnoses/exam findings/medical history pertinent to this visit are listed below:    Assessment:   Diagnosis Orders   1. Lichen planopilaris  External Referral To Dermatology    clobetasol (TEMOVATE) 0.05 % external solution    dutasteride (AVODART) 0.5 MG capsule   - chronic illness, responding to treatment but not yet at goal     Plan:  1. Lichen planopilaris  - External Referral To Dermatology (referral to Dr. Quinton Barker for phtotherapy option)  - clobetasol (TEMOVATE) 0.05 % external solution; Apply topically 2 times daily. Dispense: 50 mL; Refill: 2  - dutasteride (AVODART) 0.5 MG capsule; Take 1 capsule by mouth daily  Dispense: 30 capsule; Refill: 2  - We will resume scalp injections if uncontrolled with clobetasol solution  - Pt is very cautious with side effect potential.      RTC 8W    Future Appointments   Date Time Provider Dania Yu   6/17/2022 11:45 AM Tariq Davies PA-C  derm [facility]   6/23/2022  1:15 PM Mike Odell MD  [facility]         There are no Patient Instructions on file for this visit.       Electronically signed by Tariq Davies PA-C on 4/22/22 at 12:22 PM EDT